# Patient Record
Sex: FEMALE | Race: WHITE | Employment: FULL TIME | URBAN - METROPOLITAN AREA
[De-identification: names, ages, dates, MRNs, and addresses within clinical notes are randomized per-mention and may not be internally consistent; named-entity substitution may affect disease eponyms.]

---

## 2019-02-19 ENCOUNTER — ROUTINE PRENATAL (OUTPATIENT)
Dept: OBGYN CLINIC | Age: 34
End: 2019-02-19
Payer: COMMERCIAL

## 2019-02-19 VITALS
BODY MASS INDEX: 55.32 KG/M2 | DIASTOLIC BLOOD PRESSURE: 72 MMHG | HEART RATE: 90 BPM | HEIGHT: 61 IN | SYSTOLIC BLOOD PRESSURE: 132 MMHG | WEIGHT: 293 LBS

## 2019-02-19 DIAGNOSIS — O35.5XX0 SUSPECTED DAMAGE TO FETUS FROM DRUGS, AFFECTING MANAGEMENT OF MOTHER, SINGLE OR UNSPECIFIED FETUS: ICD-10-CM

## 2019-02-19 DIAGNOSIS — E66.01 MATERNAL MORBID OBESITY IN SECOND TRIMESTER, ANTEPARTUM (HCC): ICD-10-CM

## 2019-02-19 DIAGNOSIS — O99.212 MATERNAL MORBID OBESITY IN SECOND TRIMESTER, ANTEPARTUM (HCC): ICD-10-CM

## 2019-02-19 DIAGNOSIS — O10.012 ESSENTIAL HYPERTENSION AFFECTING PREGNANCY IN SECOND TRIMESTER: ICD-10-CM

## 2019-02-19 DIAGNOSIS — O09.522 ELDERLY MULTIGRAVIDA, SECOND TRIMESTER: Primary | ICD-10-CM

## 2019-02-19 DIAGNOSIS — Z36.89 ENCOUNTER FOR FETAL ANATOMIC SURVEY: ICD-10-CM

## 2019-02-19 DIAGNOSIS — Z3A.24 24 WEEKS GESTATION OF PREGNANCY: ICD-10-CM

## 2019-02-19 DIAGNOSIS — O09.892 PRIOR PREGNANCY COMPLICATED BY PIH, ANTEPARTUM, SECOND TRIMESTER: ICD-10-CM

## 2019-02-19 LAB
GLUCOSE URINE, POC: NEGATIVE
PROTEIN UA: NEGATIVE

## 2019-02-19 PROCEDURE — 99243 OFF/OP CNSLTJ NEW/EST LOW 30: CPT | Performed by: OBSTETRICS & GYNECOLOGY

## 2019-02-19 PROCEDURE — 76819 FETAL BIOPHYS PROFIL W/O NST: CPT | Performed by: OBSTETRICS & GYNECOLOGY

## 2019-02-19 PROCEDURE — 99201 HC NEW PT, E/M LEVEL 1: CPT | Performed by: OBSTETRICS & GYNECOLOGY

## 2019-02-19 PROCEDURE — 76820 UMBILICAL ARTERY ECHO: CPT | Performed by: OBSTETRICS & GYNECOLOGY

## 2019-02-19 PROCEDURE — 76811 OB US DETAILED SNGL FETUS: CPT | Performed by: OBSTETRICS & GYNECOLOGY

## 2019-02-19 PROCEDURE — 81002 URINALYSIS NONAUTO W/O SCOPE: CPT | Performed by: OBSTETRICS & GYNECOLOGY

## 2019-03-20 ENCOUNTER — ROUTINE PRENATAL (OUTPATIENT)
Dept: OBGYN CLINIC | Age: 34
End: 2019-03-20
Payer: COMMERCIAL

## 2019-03-20 VITALS
HEIGHT: 61 IN | WEIGHT: 293 LBS | SYSTOLIC BLOOD PRESSURE: 122 MMHG | BODY MASS INDEX: 55.32 KG/M2 | HEART RATE: 79 BPM | DIASTOLIC BLOOD PRESSURE: 82 MMHG

## 2019-03-20 DIAGNOSIS — Z3A.28 28 WEEKS GESTATION OF PREGNANCY: ICD-10-CM

## 2019-03-20 DIAGNOSIS — Z36.89 ENCOUNTER FOR FETAL ANATOMIC SURVEY: ICD-10-CM

## 2019-03-20 DIAGNOSIS — O10.012 ESSENTIAL HYPERTENSION AFFECTING PREGNANCY IN SECOND TRIMESTER: ICD-10-CM

## 2019-03-20 DIAGNOSIS — O09.523 ELDERLY MULTIGRAVIDA, THIRD TRIMESTER: ICD-10-CM

## 2019-03-20 DIAGNOSIS — O99.810 GLUCOSE INTOLERANCE OF PREGNANCY: Primary | ICD-10-CM

## 2019-03-20 DIAGNOSIS — O99.843 PREVIOUS GASTRIC BYPASS AFFECTING PREGNANCY IN THIRD TRIMESTER, ANTEPARTUM: ICD-10-CM

## 2019-03-20 DIAGNOSIS — O34.219 PREVIOUS CESAREAN DELIVERY, ANTEPARTUM CONDITION OR COMPLICATION: ICD-10-CM

## 2019-03-20 LAB
GLUCOSE URINE, POC: NORMAL
PROTEIN UA: NEGATIVE

## 2019-03-20 PROCEDURE — 76819 FETAL BIOPHYS PROFIL W/O NST: CPT | Performed by: OBSTETRICS & GYNECOLOGY

## 2019-03-20 PROCEDURE — 81002 URINALYSIS NONAUTO W/O SCOPE: CPT | Performed by: OBSTETRICS & GYNECOLOGY

## 2019-03-20 PROCEDURE — 76820 UMBILICAL ARTERY ECHO: CPT | Performed by: OBSTETRICS & GYNECOLOGY

## 2019-03-20 PROCEDURE — 99214 OFFICE O/P EST MOD 30 MIN: CPT | Performed by: OBSTETRICS & GYNECOLOGY

## 2019-03-20 PROCEDURE — 76805 OB US >/= 14 WKS SNGL FETUS: CPT | Performed by: OBSTETRICS & GYNECOLOGY

## 2019-03-20 PROCEDURE — 99211 OFF/OP EST MAY X REQ PHY/QHP: CPT | Performed by: OBSTETRICS & GYNECOLOGY

## 2019-03-20 RX ORDER — LABETALOL 100 MG/1
100 TABLET, FILM COATED ORAL 3 TIMES DAILY
COMMUNITY

## 2019-03-20 RX ORDER — BLOOD-GLUCOSE METER
1 KIT MISCELLANEOUS DAILY PRN
Qty: 1 KIT | Refills: 0 | Status: SHIPPED | OUTPATIENT
Start: 2019-03-20 | End: 2019-04-23 | Stop reason: ALTCHOICE

## 2019-03-20 RX ORDER — FERROUS SULFATE 325(65) MG
325 TABLET ORAL
COMMUNITY

## 2019-03-28 ENCOUNTER — HOSPITAL ENCOUNTER (OUTPATIENT)
Age: 34
Discharge: HOME | End: 2019-03-28
Attending: OBSTETRICS & GYNECOLOGY | Admitting: OBSTETRICS & GYNECOLOGY
Payer: COMMERCIAL

## 2019-03-28 VITALS — SYSTOLIC BLOOD PRESSURE: 128 MMHG | HEART RATE: 91 BPM | DIASTOLIC BLOOD PRESSURE: 76 MMHG | RESPIRATION RATE: 16 BRPM

## 2019-03-28 PROBLEM — Z3A.29 29 WEEKS GESTATION OF PREGNANCY: Status: ACTIVE | Noted: 2019-03-28

## 2019-03-28 LAB
BACTERIA: ABNORMAL /HPF
BILIRUBIN URINE: NEGATIVE
BLOOD, URINE: NEGATIVE
CLARITY: CLEAR
COLOR: YELLOW
FETAL FIBRONECTIN: NEGATIVE
GLUCOSE URINE: NEGATIVE MG/DL
KETONES, URINE: NEGATIVE MG/DL
LEUKOCYTE ESTERASE, URINE: ABNORMAL
NITRITE, URINE: NEGATIVE
PH UA: 6 (ref 5–9)
PROTEIN UA: NEGATIVE MG/DL
RBC UA: ABNORMAL /HPF (ref 0–2)
SPECIFIC GRAVITY UA: <=1.005 (ref 1–1.03)
UROBILINOGEN, URINE: 0.2 E.U./DL
WBC UA: ABNORMAL /HPF (ref 0–5)

## 2019-03-28 PROCEDURE — 81001 URINALYSIS AUTO W/SCOPE: CPT

## 2019-03-28 PROCEDURE — 99211 OFF/OP EST MAY X REQ PHY/QHP: CPT

## 2019-03-28 PROCEDURE — 99213 OFFICE O/P EST LOW 20 MIN: CPT | Performed by: NURSE PRACTITIONER

## 2019-03-28 PROCEDURE — 82731 ASSAY OF FETAL FIBRONECTIN: CPT

## 2019-03-28 RX ORDER — NITROFURANTOIN 25; 75 MG/1; MG/1
100 CAPSULE ORAL 2 TIMES DAILY
Qty: 14 CAPSULE | Refills: 0 | Status: SHIPPED | OUTPATIENT
Start: 2019-03-28 | End: 2019-04-04

## 2019-04-02 ENCOUNTER — ROUTINE PRENATAL (OUTPATIENT)
Dept: OBGYN CLINIC | Age: 34
End: 2019-04-02
Payer: COMMERCIAL

## 2019-04-02 VITALS
HEART RATE: 80 BPM | HEIGHT: 61 IN | WEIGHT: 293 LBS | SYSTOLIC BLOOD PRESSURE: 135 MMHG | DIASTOLIC BLOOD PRESSURE: 86 MMHG | BODY MASS INDEX: 55.32 KG/M2

## 2019-04-02 DIAGNOSIS — E66.01 MATERNAL MORBID OBESITY IN THIRD TRIMESTER, ANTEPARTUM (HCC): ICD-10-CM

## 2019-04-02 DIAGNOSIS — O09.523 ELDERLY MULTIGRAVIDA, THIRD TRIMESTER: ICD-10-CM

## 2019-04-02 DIAGNOSIS — O99.843 PREVIOUS GASTRIC BYPASS AFFECTING PREGNANCY IN THIRD TRIMESTER, ANTEPARTUM: ICD-10-CM

## 2019-04-02 DIAGNOSIS — O99.213 MATERNAL MORBID OBESITY IN THIRD TRIMESTER, ANTEPARTUM (HCC): ICD-10-CM

## 2019-04-02 DIAGNOSIS — O10.013 ESSENTIAL HYPERTENSION AFFECTING PREGNANCY IN THIRD TRIMESTER: Primary | ICD-10-CM

## 2019-04-02 DIAGNOSIS — Z3A.30 30 WEEKS GESTATION OF PREGNANCY: ICD-10-CM

## 2019-04-02 LAB
GLUCOSE URINE, POC: NEGATIVE
PROTEIN UA: NEGATIVE

## 2019-04-02 PROCEDURE — 76815 OB US LIMITED FETUS(S): CPT | Performed by: OBSTETRICS & GYNECOLOGY

## 2019-04-02 PROCEDURE — 99213 OFFICE O/P EST LOW 20 MIN: CPT | Performed by: OBSTETRICS & GYNECOLOGY

## 2019-04-02 PROCEDURE — 81002 URINALYSIS NONAUTO W/O SCOPE: CPT | Performed by: OBSTETRICS & GYNECOLOGY

## 2019-04-02 PROCEDURE — 76819 FETAL BIOPHYS PROFIL W/O NST: CPT | Performed by: OBSTETRICS & GYNECOLOGY

## 2019-04-02 PROCEDURE — 99211 OFF/OP EST MAY X REQ PHY/QHP: CPT | Performed by: OBSTETRICS & GYNECOLOGY

## 2019-04-02 PROCEDURE — 76820 UMBILICAL ARTERY ECHO: CPT | Performed by: OBSTETRICS & GYNECOLOGY

## 2019-04-02 NOTE — LETTER
19     RE:  Kary Sanchez   : 1985   AGE: 26 y.o. REFERRING PHYSICIAN:    Sheila Gamino MD    Dear   Mrs. Kary Sanchez a 26 y.o.  [id number]  is seen today on follow up in our office. REASON FOR APPOINTMENT:    · Follow up on a pregnant patient with morbid obesity, positive screen for gestational diabetes one hour GTT, status post gastric bypass surgery, advanced maternal age, essential hypertension, and first trimester exposure to an ACE inhibitor. MEDICATIONS:    Prenatal Vitamins once per day   Ferrous sulfate 325 mg by mouth per day. Labetalol 100 mg by mouth 3 times a day. Carafate 500 mg by mouth every three hours. Prilosec 20 mg by mouth per day. Flonase for treatment of sinus problems  Macrobid 100 mg by mouth twice a day      INTERVAL HISTORY:  Since her last visit to our office, Mrs Kayr Sanchez was diagnosed to have a urinary tract infection and is receiving treatment with Macrobid. Otherwise she had an uneventful course of pregnancy since her last visit to our office. When seen today in our office she had no complaints. PHYSICAL EXAMINATION:  General Appearance:  Healthy looking, alert, no acute distress. Eyes:     No pallor, no icterus, no photophobia. Ears:     No ear drainage. Nose:     No nasal drainage, no paranasal sinus tenderness. Throat:   Mucosa moist, no oral thrush, no exudate. Neck:     No nuchal rigidity. Back:     No CVA tenderness. Abdomen:    Soft nontender. Extremities:    No pretibial pitting edema, no calf muscle tenderness. Skin:     No rashes, no lesions. BP: 135/86 Weight: (!) 335 lb (152 kg) Height: 5' 1\" (154.9 cm) Pulse: 80     Body mass index is 63.3 kg/m². Urine dipstick:  Glucose : Negative   Albumin:  Negative       An ultrasound evaluation was done in our office today. Please refer to the enclosed copy of the ultrasound report for further information.     Chart and Lab Work Review: Review of her log book shows: Adequate sugar control with fasting sugars under 92 and 2hr pp under 120 mg/dl. IMPRESSION:  1. A  30w3d  intrauterine gestation. 2. Advanced maternal age. 3. Elevated one hour 50 g Glucola test on 3/9/2019.  4. Could not tolerate the three-hour glucose tolerance test in your office (threw up)  5. Essential hypertension. 6. Morbid obesity. 7. Previous  delivery  8. First trimester exposure to ACE inhibitor lisinopril. 9. Status post gastric bypass surgery. 10. Urinary tract infection, on treatment with Macrobid    RECOMMENDATIONS/PLAN:  I discussed with the patient the following points:    1. The size of her baby is appropriate for gestational age. 2. Her blood pressures are well controlled with the present dose of Labetalol. Essential hypertension is associated with an increased risk of developing intrauterine growth restriction and an increased risk of developing superimposed preeclampsia. The systolic should be kept under 349, diastolic under 003.  3. Her sugars are well controlled. I recommend that she tests her sugars fasting and two hour following each meal for three days prior to her visits to our office and that she brings of her log book for review to our office every visit. 4. Fetal well-being was confirmed today. The amount of fluid around baby is normal.  The Biophysical profile score of 8/8 is reassuring, and the umbilical artery Doppler studies are normal.  5. She should monitor fetal well-being at home by counting movements after dinner. Her baby should  move 10 times in 2 hours; otherwise, she should call your office immediately. She is also to call, if she develops any headaches, blurred vision, abdominal pain, bleeding, or spotting, which are signs of preeclampsia. 6. She is to continue to follow with you in your office for ongoing obstetric care.   7. I recommend a follow up ultrasound evaluation in 2 weeks, to check on fetal well being anatomy and growth. Thank you again, doctor, for allowing us to be of service to your patient. If I can be of further assistance, please do not hesitate to call. Sincerely,        Steve Reaves M.D., Sandra Delacruz    Current encounter billing:  [place] OFFICE OUTPATIENT VISIT 15 MINUTES [[id number]]  US OB 1 or More Fetus Limited [63934 Custom]  US Fetal Biophysical Profile WO Non Stress Testing [94370 Custom]  US Doppler Fetal Umbilical Artery [EQH8997 Custom]      **This report has been created using voice recognition software.  It may contain minor errors     which are inherent in voice recognition technology**

## 2019-04-02 NOTE — PATIENT INSTRUCTIONS
Call your primary obstetrician with bleeding, leaking of fluid, abdominal tenderness, headache, blurry vision, epigastric pain and increased urinary frequency. Any questions contact [facility] at [phone]. If you are experiencing an emergency and need immediate help, call 911 or go to go emergency room or labor and delivery. Do kick counts after dinner. Call your primary obstetrician if less than 10 kicks in 2 hours after dinner. Call your primary obstetrician with bleeding, leaking of fluid, abdominal tenderness, headache, blurry vision, epigastric pain and increased urinary frequency. if you are sick, not feeling well or have an infectious process going on please reschedule your appointment by calling [phone]. Also if any family members are not feeling well, please do not bring them to your appointment. We appreciate your cooperation. We are doing this in order to protect our pregnant mothers+ their babies. Patient Education        Weeks 30 to 28 of Your Pregnancy: Care Instructions  Your Care Instructions    You have made it to the final months of your pregnancy. By now, your baby is really starting to look like a baby, with hair and plump skin. As you enter the final weeks of pregnancy, the reality of having a baby may start to set in. This is the time to settle on a name, get your household in order, set up a safe nursery, and find quality  if needed. Doing these things in advance will allow you to focus on caring for and enjoying your new baby. You may also want to have a tour of your hospital's labor and delivery unit to get a better idea of what to expect while you are in the hospital.  During these last months, it is very important to take good care of yourself and pay attention to what your body needs. If your doctor says it is okay for you to work, don't push yourself too hard. Use the tips provided in this care sheet to ease heartburn and care for varicose veins.   If you haven't already crossed at the ankles, not the knees. · Sit with your feet propped up. · Avoid tight clothing or stockings. Wear support hose. · Exercise regularly. Try walking for at least 30 minutes a day. Where can you learn more? Go to https://jonh.healthChampionVillage. org and sign in to your Perfect Commerce account. Enter L952 in the Backspaces box to learn more about \"Weeks 30 to 32 of Your Pregnancy: Care Instructions. \"     If you do not have an account, please click on the \"Sign Up Now\" link. Current as of: September 5, 2018  Content Version: 11.9  © 7864-1742 [employer]. Care instructions adapted under license by [facility] ([facility]). If you have questions about a medical condition or this instruction, always ask your healthcare professional. [place] [address] any warranty or liability for your use of this information. Patient Education        Learning About When to Call Your Doctor During Pregnancy (After 20 Weeks)  Your Care Instructions  It's common to have concerns about what might be a problem during pregnancy. Although most pregnant women don't have any serious problems, it's important to know when to call your doctor if you have certain symptoms or signs of labor. These are general suggestions. Your doctor may give you some more information about when to call. When to call your doctor (after 20 weeks)  Call 911 anytime you think you may need emergency care. For example, call if:  · You have severe vaginal bleeding. · You have sudden, severe pain in your belly. · You passed out (lost consciousness). · You have a seizure. · You see or feel the umbilical cord. · You think you are about to deliver your baby and can't make it safely to the hospital.  Call your doctor now or seek immediate medical care if:  · You have vaginal bleeding. · You have belly pain. · You have a fever.   · You have symptoms of preeclampsia, such as:  ? Sudden swelling of your face, hands, or feet.  ? New vision problems (such as dimness or blurring). ? A severe headache. · You have a sudden release of fluid from your vagina. (You think your water broke.)  · You think that you may be in labor. This means that you've had at least 4 contractions within 20 minutes or at least 8 contractions in an hour. · You notice that your baby has stopped moving or is moving much less than normal.  · You have symptoms of a urinary tract infection. These may include:  ? Pain or burning when you urinate. ? A frequent need to urinate without being able to pass much urine. ? Pain in the flank, which is just below the rib cage and above the waist on either side of the back. ? Blood in your urine. Watch closely for changes in your health, and be sure to contact your doctor if:  · You have vaginal discharge that smells bad. · You have skin changes, such as:  ? A rash. ? Itching. ? Yellow color to your skin. · You have other concerns about your pregnancy. If you have labor signs at 37 weeks or more  If you have signs of labor at 37 weeks or more, your doctor may tell you to call when your labor becomes more active. Symptoms of active labor include:  · Contractions that are regular. · Contractions that are less than 5 minutes apart. · Contractions that are hard to talk through. Follow-up care is a key part of your treatment and safety. Be sure to make and go to all appointments, and call your doctor if you are having problems. It's also a good idea to know your test results and keep a list of the medicines you take. Where can you learn more? Go to https://jonh.[employer]ella Learning. org and sign in to your Goodoc account. Enter  in the [employer] box to learn more about \"Learning About When to Call Your Doctor During Pregnancy (After 20 Weeks). \"     If you do not have an account, please click on the \"Sign Up Now\" link.   Current as of: September 5, 2018  Content Version: 11.9  © 2075-9251 Healthwise, Incorporated. Care instructions adapted under license by [facility] ([facility]). If you have questions about a medical condition or this instruction, always ask your healthcare professional. [address] any warranty or liability for your use of this information. Patient Education        Counting Your Baby's Kicks: Care Instructions  Your Care Instructions    Counting your baby's kicks is one way your doctor can tell that your baby is healthy. Most women--especially in a first pregnancy--feel their baby move for the first time between 16 and 22 weeks. The movement may feel like flutters rather than kicks. Your baby may move more at certain times of the day. When you are active, you may notice less kicking than when you are resting. At your prenatal visits, your doctor will ask whether the baby is active. In your last trimester, your doctor may ask you to count the number of times you feel your baby move. Follow-up care is a key part of your treatment and safety. Be sure to make and go to all appointments, and call your doctor if you are having problems. It's also a good idea to know your test results and keep a list of the medicines you take. How do you count fetal kicks? · A common method of checking your baby's movement is to count the number of kicks or moves you feel in 1 hour. Ten movements (such as kicks, flutters, or rolls) in 1 hour are normal. Some doctors suggest that you count in the morning until you get to 10 movements. Then you can quit for that day and start again the next day. · Pick your baby's most active time of day to count. This may be any time from morning to evening. · If you do not feel 10 movements in an hour, your baby may be sleeping. Wait for the next hour and count again. When should you call for help?   Call your doctor now or seek immediate medical care if:    · You noticed that your baby has stopped moving or is moving much less than normal.  Watch closely for changes in your health, and be sure to contact your doctor if you have any problems. Where can you learn more? Go to https://chpepiceweb.Cinegif. org and sign in to your Futuretec account. Enter Z634 in the ETF Securities box to learn more about \"Counting Your Baby's Kicks: Care Instructions. \"     If you do not have an account, please click on the \"Sign Up Now\" link. Current as of: September 5, 2018  Content Version: 11.9  © 0533-9554 [employer]. Care instructions adapted under license by [facility] ([facility]). If you have questions about a medical condition or this instruction, always ask your healthcare professional. [address] any warranty or liability for your use of this information. Patient Education        High Blood Pressure in Pregnancy: Care Instructions  Your Care Instructions    High blood pressure (hypertension) means that the force of blood against your artery walls is too strong. Mild high blood pressure during pregnancy is not usually dangerous. Your doctor will probably just want to watch you closely. But when blood pressure is very high, it can reduce oxygen to your baby. This can affect how well your baby grows. High blood pressure also means that you are at higher risk for:  · Preeclampsia. This is a problem that includes high blood pressure and damage to your liver or kidneys. It can also reduce how much oxygen your baby gets. In some cases, it leads to eclampsia. Eclampsia causes seizures. · Placental abruption. This is a problem when the placenta separates from the uterus before birth. It prevents the baby from getting enough oxygen and nutrients. Sometimes it can cause death for the baby and the mother. To prevent problems for you or your baby, you will have to check your blood pressure often. You will do this until after your baby is born.   If your blood pressure rises suddenly or is very high during your pregnancy, your doctor may prescribe medicines. They can usually control blood pressure. If your blood pressure affects your or your baby's health, your doctor may need to deliver your baby early. After your baby is born, your blood pressure will probably improve. But sometimes blood pressure problems continue after birth. Follow-up care is a key part of your treatment and safety. Be sure to make and go to all appointments, and call your doctor if you are having problems. It's also a good idea to know your test results and keep a list of the medicines you take. How can you care for yourself at home? · Take and write down your blood pressure at home if your doctor tells you to. · Take your medicines exactly as prescribed. Call your doctor if you think you are having a problem with your medicine. · Do not smoke. If you need help quitting, talk to your doctor about stop-smoking programs and medicines. These can increase your chances of quitting for good. · Do not gain too much weight during your pregnancy. Talk to your doctor about how much weight gain is healthy. · Eat a healthy diet. · If your doctor says it's okay, get regular exercise. Walking or swimming several times a week can be healthy for you and your baby. · Reduce stress, and find time to relax. This is very important if you continue to work or have a busy schedule. It's also important if you have small children at home. When should you call for help? Call 911 anytime you think you may need emergency care. For example, call if:    · You passed out (lost consciousness).     · You have a seizure.    Call your doctor now or seek immediate medical care if:    · You have symptoms of preeclampsia, such as:  ? Sudden swelling of your face, hands, or feet. ? New vision problems (such as dimness or blurring).   ? A severe headache.     · Your blood pressure is higher than it should be or rises suddenly.     · You have new nausea or vomiting.     · You think that you are in labor.     · You have pain in your belly or pelvis.    Watch closely for changes in your health, and be sure to contact your doctor if:    · You gain weight rapidly. Where can you learn more? Go to https://chpepiceweb.healthLoopport. org and sign in to your CTMG account. Enter C268 in the Pointstic box to learn more about \"High Blood Pressure in Pregnancy: Care Instructions. \"     If you do not have an account, please click on the \"Sign Up Now\" link. Current as of: September 5, 2018  Content Version: 11.9  © 8265-4026 [employer]Dox, Incorporated. Care instructions adapted under license by [facility] ([facility]). If you have questions about a medical condition or this instruction, always ask your healthcare professional. [address] any warranty or liability for your use of this information.

## 2019-04-16 ENCOUNTER — ROUTINE PRENATAL (OUTPATIENT)
Dept: OBGYN CLINIC | Age: 34
End: 2019-04-16
Payer: COMMERCIAL

## 2019-04-16 VITALS
DIASTOLIC BLOOD PRESSURE: 84 MMHG | SYSTOLIC BLOOD PRESSURE: 139 MMHG | HEIGHT: 61 IN | WEIGHT: 293 LBS | HEART RATE: 79 BPM | BODY MASS INDEX: 55.32 KG/M2

## 2019-04-16 DIAGNOSIS — Z3A.32 32 WEEKS GESTATION OF PREGNANCY: ICD-10-CM

## 2019-04-16 DIAGNOSIS — O99.213 MATERNAL MORBID OBESITY IN THIRD TRIMESTER, ANTEPARTUM (HCC): ICD-10-CM

## 2019-04-16 DIAGNOSIS — E66.01 MATERNAL MORBID OBESITY IN THIRD TRIMESTER, ANTEPARTUM (HCC): ICD-10-CM

## 2019-04-16 DIAGNOSIS — O10.013 ESSENTIAL HYPERTENSION AFFECTING PREGNANCY IN THIRD TRIMESTER: Primary | ICD-10-CM

## 2019-04-16 DIAGNOSIS — O09.523 ELDERLY MULTIGRAVIDA, THIRD TRIMESTER: ICD-10-CM

## 2019-04-16 DIAGNOSIS — O99.843 PREVIOUS GASTRIC BYPASS AFFECTING PREGNANCY IN THIRD TRIMESTER, ANTEPARTUM: ICD-10-CM

## 2019-04-16 LAB
GLUCOSE URINE, POC: NORMAL
PROTEIN UA: NEGATIVE

## 2019-04-16 PROCEDURE — 76816 OB US FOLLOW-UP PER FETUS: CPT | Performed by: OBSTETRICS & GYNECOLOGY

## 2019-04-16 PROCEDURE — 76818 FETAL BIOPHYS PROFILE W/NST: CPT | Performed by: OBSTETRICS & GYNECOLOGY

## 2019-04-16 PROCEDURE — 81002 URINALYSIS NONAUTO W/O SCOPE: CPT | Performed by: OBSTETRICS & GYNECOLOGY

## 2019-04-16 PROCEDURE — 99213 OFFICE O/P EST LOW 20 MIN: CPT | Performed by: OBSTETRICS & GYNECOLOGY

## 2019-04-16 PROCEDURE — 76820 UMBILICAL ARTERY ECHO: CPT | Performed by: OBSTETRICS & GYNECOLOGY

## 2019-04-16 PROCEDURE — 99211 OFF/OP EST MAY X REQ PHY/QHP: CPT | Performed by: OBSTETRICS & GYNECOLOGY

## 2019-04-16 RX ORDER — FLUTICASONE PROPIONATE 50 MCG
2 SPRAY, SUSPENSION (ML) NASAL DAILY
COMMUNITY

## 2019-04-16 NOTE — LETTER
19     RE:  Garth Jiménez   : 1985   AGE: 26 y.o. REFERRING PHYSICIAN:  Mynor Rodriguez MD    Dear   Mrs. Garth Jiménez a 26 y.o.  [id number]  is seen today on follow up in our office. REASON FOR APPOINTMENT:    · Follow up on a pregnant patient with morbid obesity, positive screen for gestational diabetes one hour GTT, status post gastric bypass surgery, advanced maternal age, essential hypertension, and first trimester exposure to an ACE inhibitor. MEDICATIONS:    Prenatal Vitamins once per day   Ferrous sulfate 325 mg by mouth per day. Labetalol 100 mg by mouth 3 times a day. Carafate 500 mg by mouth every three hours. Prilosec 20 mg by mouth per day. Flonase for treatment of sinus problems    INTERVAL HISTORY:  Mrs Garth Jiménez had an uneventful course of pregnancy since her last visit to our office. When seen today in our office she had no complaints. PHYSICAL EXAMINATION:  General Appearance:  Healthy looking, alert, no acute distress. Eyes:     No pallor, no icterus, no photophobia. Ears:     No ear drainage. Nose:     No nasal drainage, no paranasal sinus tenderness. Throat:   Mucosa moist, no oral thrush, no exudate. Neck:     No nuchal rigidity. Back:     No CVA tenderness. Abdomen:    Soft nontender. Extremities:    No pretibial pitting edema, no calf muscle tenderness. Skin:     No rashes, no lesions. BP: 139/84 Weight: (!) 336 lb (152.4 kg) Height: 5' 1\" (154.9 cm) Pulse: 79     Body mass index is 63.49 kg/m². Urine dipstick:  Glucose : Negative   Albumin:  Negative       An ultrasound evaluation was done in our office today. Please refer to the enclosed copy of the ultrasound report for further information. Chart and Lab Work Review:  Review of her log book shows: Adequate sugar control with fasting sugars under 92 and 2hr pp under 120 mg/dl. IMPRESSION:  1. A  32w3d  intrauterine gestation. 2. Advanced maternal age. 9. She should be monitored with nonstress test every Friday in the labor unit and with biophysical profiles and umbilical artery Doppler once a week in our office every Tuesday for remainder of pregnancy. Thank you again, doctor, for allowing us to be of service to your patient. If I can be of further assistance, please do not hesitate to call. Sincerely,        Angela Hills M.D., [address] [facility]    Current encounter billing:  [place] OFFICE OUTPATIENT VISIT 15 MINUTES [[id number]]  US OB Follow Up Transabdominal Approach [JJQ203 Custom]  Biophysical profile [OBO12 Custom]  US Doppler Fetal Umbilical Artery [[id number] Custom]    **This report has been created using voice recognition software.  It may contain minor errors     which are inherent in voice recognition technology**

## 2019-04-16 NOTE — PROGRESS NOTES
NST completed. Baseline 140 with moderate variability. accelelrations do not meet criteria for reactivity.  Non-reactive per dr Marine Lockhart

## 2019-04-16 NOTE — PATIENT INSTRUCTIONS
Call your primary obstetrician with bleeding, leaking of fluid, abdominal tenderness, headache, blurry vision, epigastric pain and increased urinary frequency. Any questions contact [facility] at [phone]. If you are experiencing an emergency and need immediate help, call 911 or go to go emergency room or labor and delivery. Do kick counts after dinner. Call your primary obstetrician if less than 10 kicks in 2 hours after dinner. Call your primary obstetrician with bleeding, leaking of fluid, abdominal tenderness, headache, blurry vision, epigastric pain and increased urinary frequency. if you are sick, not feeling well or have an infectious process going on please reschedule your appointment by calling [phone]. Also if any family members are not feeling well, please do not bring them to your appointment. We appreciate your cooperation. We are doing this in order to protect our pregnant mothers+ their babies. Patient Education        Weeks 32 to 29 of Your Pregnancy: Care Instructions  Your Care Instructions    During the last few weeks of your pregnancy, you may have more aches and pains. It's important to rest when you can. Your growing baby is putting more pressure on your bladder. So you may need to urinate more often. Hemorrhoids are also common. These are painful, itchy veins in the rectal area. In the 36th week, most women have a test for group B streptococcus (GBS). GBS is a common bacteria that can live in the vagina and rectum. It can make your baby sick after birth. If you test positive, you will get antibiotics during labor. These will keep your baby from getting the bacteria. You may want to talk with your doctor about banking your baby's umbilical cord blood. This is the blood left in the cord after birth. If you want to save this blood, you must arrange it ahead of time. You can't decide at the last minute.   If you haven't already had the Tdap shot during this pregnancy, talk to your doctor about getting it. It will help protect your  against pertussis infection. Follow-up care is a key part of your treatment and safety. Be sure to make and go to all appointments, and call your doctor if you are having problems. It's also a good idea to know your test results and keep a list of the medicines you take. How can you care for yourself at home? Ease hemorrhoids  · Get more liquids, fruits, vegetables, and fiber in your diet. This will help keep your stools soft. · Avoid sitting for too long. Lie on your left side several times a day. · Clean yourself with soft, moist toilet paper. Or you can use witch hazel pads or personal hygiene pads. · If you are uncomfortable, try ice packs. Or you can sit in a warm sitz bath. Do these for 20 minutes at a time, as needed. · Use hydrocortisone cream for pain and itching. Two examples are Anusol and Preparation H Hydrocortisone. · Ask your doctor about taking an over-the-counter stool softener. Consider breastfeeding  · Experts recommend that women breastfeed for 1 year or longer. Breast milk is the perfect food for babies. · Breast milk is easier for babies to digest than formula. And it is always available, just the right temperature, and free. · Breast milk may help protect your child from some health problems.  babies are less likely than formula-fed babies to:  ? Get ear infections, colds, diarrhea, and pneumonia. ? Be obese or get diabetes later in life. · Women who breastfeed have less bleeding after the birth. Their uteruses also shrink back faster. · Some women who breastfeed lose weight faster. Making milk burns calories. · Breastfeeding can lower your risk of breast cancer, ovarian cancer, and osteoporosis. Decide about circumcision for boys  · As you make this decision, it may help to think about your personal, Latter-day, and family traditions.  You get to decide if you will keep your son's penis natural or if he will be circumcised. · If you decide that you would like to have your baby circumcised, talk with your doctor. You can share your concerns about pain. And you can discuss your preferences for anesthesia. Where can you learn more? Go to [email or web address]://jonh.[email or web address] org and sign in to your [email or web address] account. Enter W191 in the [facility] box to learn more about \"Weeks 32 to 34 of Your Pregnancy: Care Instructions. \"     If you do not have an account, please click on the \"Sign Up Now\" link. Current as of: September 5, 2018  Content Version: 11.9  © 6094-1961 [employer]. Care instructions adapted under license by [facility] ([facility]). If you have questions about a medical condition or this instruction, always ask your healthcare professional. [address] any warranty or liability for your use of this information. Patient Education        Learning About When to Call Your Doctor During Pregnancy (After 20 Weeks)  Your Care Instructions  It's common to have concerns about what might be a problem during pregnancy. Although most pregnant women don't have any serious problems, it's important to know when to call your doctor if you have certain symptoms or signs of labor. These are general suggestions. Your doctor may give you some more information about when to call. When to call your doctor (after 20 weeks)  Call 911 anytime you think you may need emergency care. For example, call if:  · You have severe vaginal bleeding. · You have sudden, severe pain in your belly. · You passed out (lost consciousness). · You have a seizure. · You see or feel the umbilical cord. · You think you are about to deliver your baby and can't make it safely to the hospital.  Call your doctor now or seek immediate medical care if:  · You have vaginal bleeding. · You have belly pain. · You have a fever.   · You have symptoms of preeclampsia, such as:  ? Sudden swelling of your face, hands, or feet.  ? New vision problems (such as dimness or blurring). ? A severe headache. · You have a sudden release of fluid from your vagina. (You think your water broke.)  · You think that you may be in labor. This means that you've had at least 4 contractions within 20 minutes or at least 8 contractions in an hour. · You notice that your baby has stopped moving or is moving much less than normal.  · You have symptoms of a urinary tract infection. These may include:  ? Pain or burning when you urinate. ? A frequent need to urinate without being able to pass much urine. ? Pain in the flank, which is just below the rib cage and above the waist on either side of the back. ? Blood in your urine. Watch closely for changes in your health, and be sure to contact your doctor if:  · You have vaginal discharge that smells bad. · You have skin changes, such as:  ? A rash. ? Itching. ? Yellow color to your skin. · You have other concerns about your pregnancy. If you have labor signs at 37 weeks or more  If you have signs of labor at 37 weeks or more, your doctor may tell you to call when your labor becomes more active. Symptoms of active labor include:  · Contractions that are regular. · Contractions that are less than 5 minutes apart. · Contractions that are hard to talk through. Follow-up care is a key part of your treatment and safety. Be sure to make and go to all appointments, and call your doctor if you are having problems. It's also a good idea to know your test results and keep a list of the medicines you take. Where can you learn more? Go to https://jonh.Mesh [email or web address] org and sign in to your [employer] account. Enter  in the [employer] box to learn more about \"Learning About When to Call Your Doctor During Pregnancy (After 20 Weeks). \"     If you do not have an account, please click on the \"Sign Up Now\" link.   Current as of: September 5, 2018  Content Version: 11.9  © 5302-2202 Healthwise, Incorporated. Care instructions adapted under license by [facility] ([facility]). If you have questions about a medical condition or this instruction, always ask your healthcare professional. [address] any warranty or liability for your use of this information. Patient Education        Counting Your Baby's Kicks: Care Instructions  Your Care Instructions    Counting your baby's kicks is one way your doctor can tell that your baby is healthy. Most women--especially in a first pregnancy--feel their baby move for the first time between 16 and 22 weeks. The movement may feel like flutters rather than kicks. Your baby may move more at certain times of the day. When you are active, you may notice less kicking than when you are resting. At your prenatal visits, your doctor will ask whether the baby is active. In your last trimester, your doctor may ask you to count the number of times you feel your baby move. Follow-up care is a key part of your treatment and safety. Be sure to make and go to all appointments, and call your doctor if you are having problems. It's also a good idea to know your test results and keep a list of the medicines you take. How do you count fetal kicks? · A common method of checking your baby's movement is to count the number of kicks or moves you feel in 1 hour. Ten movements (such as kicks, flutters, or rolls) in 1 hour are normal. Some doctors suggest that you count in the morning until you get to 10 movements. Then you can quit for that day and start again the next day. · Pick your baby's most active time of day to count. This may be any time from morning to evening. · If you do not feel 10 movements in an hour, your baby may be sleeping. Wait for the next hour and count again. When should you call for help?   Call your doctor now or seek immediate medical care if:    · You noticed that your baby has stopped moving or is moving much less than normal.  Watch closely for changes in your health, and be sure to contact your doctor if you have any problems. Where can you learn more? Go to [email or web address]://chpepiceweb.[email or web address] org and sign in to your [employer] account. Enter G528 in the [employer] box to learn more about \"Counting Your Baby's Kicks: Care Instructions. \"     If you do not have an account, please click on the \"Sign Up Now\" link. Current as of: September 5, 2018  Content Version: 11.9  © 6891-9876 Spectraseis. Care instructions adapted under license by [facility] ([facility]). If you have questions about a medical condition or this instruction, always ask your healthcare professional. [address] any warranty or liability for your use of this information. Patient Education        High Blood Pressure in Pregnancy: Care Instructions  Your Care Instructions    High blood pressure (hypertension) means that the force of blood against your artery walls is too strong. Mild high blood pressure during pregnancy is not usually dangerous. Your doctor will probably just want to watch you closely. But when blood pressure is very high, it can reduce oxygen to your baby. This can affect how well your baby grows. High blood pressure also means that you are at higher risk for:  · Preeclampsia. This is a problem that includes high blood pressure and damage to your liver or kidneys. It can also reduce how much oxygen your baby gets. In some cases, it leads to eclampsia. Eclampsia causes seizures. · Placental abruption. This is a problem when the placenta separates from the uterus before birth. It prevents the baby from getting enough oxygen and nutrients. Sometimes it can cause death for the baby and the mother. To prevent problems for you or your baby, you will have to check your blood pressure often. You will do this until after your baby is born.   If your blood pressure rises suddenly or is very high during your think that you are in labor.     · You have pain in your belly or pelvis.    Watch closely for changes in your health, and be sure to contact your doctor if:    · You gain weight rapidly. Where can you learn more? Go to [email or web address]://chdel.healthStudio [email or web address] org and sign in to your [employer] account. Enter C508 in the [facility] box to learn more about \"High Blood Pressure in Pregnancy: Care Instructions. \"     If you do not have an account, please click on the \"Sign Up Now\" link. Current as of: 2018  Content Version: 11.9  © 0162-1720 [employer]. Care instructions adapted under license by [facility] ([facility]). If you have questions about a medical condition or this instruction, always ask your healthcare professional. [address] any warranty or liability for your use of this information. Patient Education        Home Blood Glucose Test: About This Test  What is it? A home blood glucose test measures the amount of a type of sugar, called glucose, in your blood. Why is this test done? People who have diabetes need to check the amount of glucose in their blood. A home blood glucose test is an easy way to test your blood at home or when you are away from home. The results help you know when to take action to keep your blood glucose levels in a target range. How can you prepare for the test?  · Check the expiration date on the bottle of testing strips. Do not use test strips that have . · Match the code number on the testing strips bottle with the number on the meter. If the numbers do not match, follow the directions with the meter for changing the code number. What happens before the test?  The supplies you will need for testing blood glucose include:  · A blood glucose meter. · Testing strips. These are made to be used with a specific model of meter. · Sugar control solutions. Some meters require a specific solution.  Many new meters are made to operate without a control solution. · Short needles called lancets for pricking your skin. · A pen-sized venegas for the lancet (lancet device), which positions the lancet and controls how deeply it goes into your skin. · Clean cotton balls. These are used to stop the bleeding from the testing site. What happens during the test?  A home blood glucose test involves pricking your finger, palm, or forearm with a lancet to collect a drop of blood. The blood drop is placed on a test strip, which you insert into the blood glucose meter. The instructions for testing are slightly different for each blood glucose meter model. Follow the instructions that came with your meter. · Wash your hands with warm, soapy water. Dry them well with a clean towel. You may also use an alcohol wipe to clean your finger or other site, but make sure your hands are dry before the test.  · Insert a clean lancet into the lancet device. · Remove a test strip from the test strip bottle. Replace the lid immediately to keep moisture away from the other strips. · Follow the instructions that came with your meter to get it ready. · Use the lancet device to stick the side of your fingertip with the lancet. Do not stick the tip of your finger. Some blood sugar meters use lancet devices that take the blood sample from other sites, such as the palm of the hand or the forearm. But the finger is usually the most accurate place to test blood sugar. · Put a drop of blood on the correct spot on the test strip. · Apply pressure with a clean cotton ball to stop the bleeding. · Follow the directions that came with the meter to get the results. · Write down the results and the time that you tested your blood. Some meters will store the results for you. What else should you know about the test?  The American Diabetes Association (ADA) recommends that you stay within the following blood glucose level ranges.  But depending on your health, you and your doctor may set a different range for you. For nonpregnant adults with diabetes:  · 80 milligrams per deciliter (mg/dL) to 130 mg/dL before a meal  · Less than 180 mg/dL 1 to 2 hours after a meal  For women who have diabetes related to pregnancy (gestational diabetes):  · 95 mg/dL or less before breakfast  · 120 to 140 mg/dL (or lower) 1 to 2 hours after a meal  How long does the test take? · The blood glucose meter will show the results of the test in a minute or less. Where can you learn more? Go to [email or web address]://[email or web address].[email or web address] org and sign in to your [employer] account. Enter V346 in the [employer] box to learn more about \"Home Blood Glucose Test: About This Test.\"     If you do not have an account, please click on the \"Sign Up Now\" link. Current as of: July 25, 2018  Content Version: 11.9  © 7400-9222 Xangati, Incorporated. Care instructions adapted under license by [facility] ([facility]). If you have questions about a medical condition or this instruction, always ask your healthcare professional. [place] [address] any warranty or liability for your use of this information.

## 2019-04-16 NOTE — PROGRESS NOTES
19     RE:  Kim Webb   : 1985   AGE: 26 y.o. REFERRING PHYSICIAN:  Bard Wilmar MD    Dear   Mrs. Kim Webb a 26 y.o.  [id number]  is seen today on follow up in our office. REASON FOR APPOINTMENT:    · Follow up on a pregnant patient with morbid obesity, positive screen for gestational diabetes one hour GTT, status post gastric bypass surgery, advanced maternal age, essential hypertension, and first trimester exposure to an ACE inhibitor. MEDICATIONS:    Prenatal Vitamins once per day   Ferrous sulfate 325 mg by mouth per day. Labetalol 100 mg by mouth 3 times a day. Carafate 500 mg by mouth every three hours. Prilosec 20 mg by mouth per day. Flonase for treatment of sinus problems    INTERVAL HISTORY:  Mrs Kim Webb had an uneventful course of pregnancy since her last visit to our office. When seen today in our office she had no complaints. PHYSICAL EXAMINATION:  General Appearance:  Healthy looking, alert, no acute distress. Eyes:     No pallor, no icterus, no photophobia. Ears:     No ear drainage. Nose:     No nasal drainage, no paranasal sinus tenderness. Throat:   Mucosa moist, no oral thrush, no exudate. Neck:     No nuchal rigidity. Back:     No CVA tenderness. Abdomen:    Soft nontender. Extremities:    No pretibial pitting edema, no calf muscle tenderness. Skin:     No rashes, no lesions. BP: 139/84 Weight: (!) 336 lb (152.4 kg) Height: 5' 1\" (154.9 cm) Pulse: 79     Body mass index is 63.49 kg/m². Urine dipstick:  Glucose : Negative   Albumin:  Negative       An ultrasound evaluation was done in our office today. Please refer to the enclosed copy of the ultrasound report for further information. Chart and Lab Work Review:  Review of her log book shows: Adequate sugar control with fasting sugars under 92 and 2hr pp under 120 mg/dl. IMPRESSION:  1. A  32w3d  intrauterine gestation.    2. Advanced maternal age.  3. Elevated one hour 50 g Glucola test on 3/9/2019.  4. Could not tolerate the three-hour glucose tolerance test in your office (threw up)  5. Essential hypertension. 6. Morbid obesity. 7. Previous  delivery  8. First trimester exposure to ACE inhibitor lisinopril. 9. Status post gastric bypass surgery. RECOMMENDATIONS/PLAN:  I discussed with the patient the following points:    1. The benefits and limitations of ultrasound in prenatal diagnosis. Some defects might not always be seen by ultrasound. Estimated incidence of these defects in the general population is 2- 4%. 2. No structural  anomalies are noted. Only genetic amniocentesis can rule out fetal chromosome anomalies. Normal ultrasound does not. 3. The size of her baby is appropriate for gestational age. 4. Her blood pressures are well controlled with the present dose of Labetalol. Essential hypertension is associated with an increased risk of developing intrauterine growth restriction and an increased risk of developing superimposed preeclampsia. The systolic should be kept under 020, diastolic under 117.  5. Her sugars are well controlled. I recommend that she tests her sugars fasting and two hour following each meal for three days prior to her visits to our office and that she brings of her log book for review to our office every visit. 6. Fetal well-being was confirmed today. The amount of fluid around baby is normal.  The Biophysical profile score of 8/10 is reassuring, and the umbilical artery Doppler studies are normal.  7. She should monitor fetal well-being at home by counting movements after dinner. Her baby should  move 10 times in 2 hours; otherwise, she should call your office immediately. She is also to call, if she develops any headaches, blurred vision, abdominal pain, bleeding, or spotting, which are signs of preeclampsia.      8. She is to continue to follow with you in your office for ongoing obstetric

## 2019-04-19 ENCOUNTER — ROUTINE PRENATAL (OUTPATIENT)
Dept: OBGYN CLINIC | Age: 34
End: 2019-04-19
Payer: COMMERCIAL

## 2019-04-19 VITALS
BODY MASS INDEX: 64.24 KG/M2 | WEIGHT: 293 LBS | DIASTOLIC BLOOD PRESSURE: 82 MMHG | HEART RATE: 81 BPM | SYSTOLIC BLOOD PRESSURE: 139 MMHG

## 2019-04-19 DIAGNOSIS — O99.213 MATERNAL MORBID OBESITY IN THIRD TRIMESTER, ANTEPARTUM (HCC): ICD-10-CM

## 2019-04-19 DIAGNOSIS — O10.013 ESSENTIAL HYPERTENSION AFFECTING PREGNANCY IN THIRD TRIMESTER: Primary | ICD-10-CM

## 2019-04-19 DIAGNOSIS — Z3A.32 32 WEEKS GESTATION OF PREGNANCY: ICD-10-CM

## 2019-04-19 DIAGNOSIS — E66.01 MATERNAL MORBID OBESITY IN THIRD TRIMESTER, ANTEPARTUM (HCC): ICD-10-CM

## 2019-04-19 DIAGNOSIS — O99.843 PREVIOUS GASTRIC BYPASS AFFECTING PREGNANCY IN THIRD TRIMESTER, ANTEPARTUM: ICD-10-CM

## 2019-04-19 DIAGNOSIS — O09.523 ELDERLY MULTIGRAVIDA, THIRD TRIMESTER: ICD-10-CM

## 2019-04-19 LAB
GLUCOSE URINE, POC: NEGATIVE
PROTEIN UA: NEGATIVE

## 2019-04-19 PROCEDURE — 99211 OFF/OP EST MAY X REQ PHY/QHP: CPT | Performed by: OBSTETRICS & GYNECOLOGY

## 2019-04-19 PROCEDURE — 81002 URINALYSIS NONAUTO W/O SCOPE: CPT | Performed by: OBSTETRICS & GYNECOLOGY

## 2019-04-19 PROCEDURE — 59025 FETAL NON-STRESS TEST: CPT | Performed by: OBSTETRICS & GYNECOLOGY

## 2019-04-19 PROCEDURE — 99999 PR OFFICE/OUTPT VISIT,PROCEDURE ONLY: CPT | Performed by: OBSTETRICS & GYNECOLOGY

## 2019-04-19 NOTE — PATIENT INSTRUCTIONS
Patient Education        Weeks 32 to 29 of Your Pregnancy: Care Instructions  Your Care Instructions    During the last few weeks of your pregnancy, you may have more aches and pains. It's important to rest when you can. Your growing baby is putting more pressure on your bladder. So you may need to urinate more often. Hemorrhoids are also common. These are painful, itchy veins in the rectal area. In the 36th week, most women have a test for group B streptococcus (GBS). GBS is a common bacteria that can live in the vagina and rectum. It can make your baby sick after birth. If you test positive, you will get antibiotics during labor. These will keep your baby from getting the bacteria. You may want to talk with your doctor about banking your baby's umbilical cord blood. This is the blood left in the cord after birth. If you want to save this blood, you must arrange it ahead of time. You can't decide at the last minute. If you haven't already had the Tdap shot during this pregnancy, talk to your doctor about getting it. It will help protect your  against pertussis infection. Follow-up care is a key part of your treatment and safety. Be sure to make and go to all appointments, and call your doctor if you are having problems. It's also a good idea to know your test results and keep a list of the medicines you take. How can you care for yourself at home? Ease hemorrhoids  · Get more liquids, fruits, vegetables, and fiber in your diet. This will help keep your stools soft. · Avoid sitting for too long. Lie on your left side several times a day. · Clean yourself with soft, moist toilet paper. Or you can use witch hazel pads or personal hygiene pads. · If you are uncomfortable, try ice packs. Or you can sit in a warm sitz bath. Do these for 20 minutes at a time, as needed. · Use hydrocortisone cream for pain and itching. Two examples are Anusol and Preparation H Hydrocortisone.   · Ask your doctor about taking an over-the-counter stool softener. Consider breastfeeding  · Experts recommend that women breastfeed for 1 year or longer. Breast milk is the perfect food for babies. · Breast milk is easier for babies to digest than formula. And it is always available, just the right temperature, and free. · Breast milk may help protect your child from some health problems.  babies are less likely than formula-fed babies to:  ? Get ear infections, colds, diarrhea, and pneumonia. ? Be obese or get diabetes later in life. · Women who breastfeed have less bleeding after the birth. Their uteruses also shrink back faster. · Some women who breastfeed lose weight faster. Making milk burns calories. · Breastfeeding can lower your risk of breast cancer, ovarian cancer, and osteoporosis. Decide about circumcision for boys  · As you make this decision, it may help to think about your personal, Congregation, and family traditions. You get to decide if you will keep your son's penis natural or if he will be circumcised. · If you decide that you would like to have your baby circumcised, talk with your doctor. You can share your concerns about pain. And you can discuss your preferences for anesthesia. Where can you learn more? Go to https://ASSET4pepicMobisante.healthWestward Leaning. org and sign in to your [employer] account. Enter X070 in the Squidbid box to learn more about \"Weeks 32 to 34 of Your Pregnancy: Care Instructions. \"     If you do not have an account, please click on the \"Sign Up Now\" link. Current as of: September 5, 2018  Content Version: 11.9  © 2529-8987 Gnip, Incorporated. Care instructions adapted under license by [facility] [place] ([facility]). If you have questions about a medical condition or this instruction, always ask your healthcare professional. [place] [address] any warranty or liability for your use of this information.          Patient Education        Learning About When to Call Your have other concerns about your pregnancy. If you have labor signs at 37 weeks or more  If you have signs of labor at 37 weeks or more, your doctor may tell you to call when your labor becomes more active. Symptoms of active labor include:  · Contractions that are regular. · Contractions that are less than 5 minutes apart. · Contractions that are hard to talk through. Follow-up care is a key part of your treatment and safety. Be sure to make and go to all appointments, and call your doctor if you are having problems. It's also a good idea to know your test results and keep a list of the medicines you take. Where can you learn more? Go to https://ActionTax.capepiceweb.Intern. org and sign in to your Taxizu account. Enter  in the JumpLinc box to learn more about \"Learning About When to Call Your Doctor During Pregnancy (After 20 Weeks). \"     If you do not have an account, please click on the \"Sign Up Now\" link. Current as of: September 5, 2018  Content Version: 11.9  © 8910-2224 [employer]. Care instructions adapted under license by [facility] ([facility]). If you have questions about a medical condition or this instruction, always ask your healthcare professional. [place] [address] any warranty or liability for your use of this information. Patient Education        Counting Your Baby's Kicks: Care Instructions  Your Care Instructions    Counting your baby's kicks is one way your doctor can tell that your baby is healthy. Most women--especially in a first pregnancy--feel their baby move for the first time between 16 and 22 weeks. The movement may feel like flutters rather than kicks. Your baby may move more at certain times of the day. When you are active, you may notice less kicking than when you are resting. At your prenatal visits, your doctor will ask whether the baby is active.   In your last trimester, your doctor may ask you to count the number of times you feel your baby move. Follow-up care is a key part of your treatment and safety. Be sure to make and go to all appointments, and call your doctor if you are having problems. It's also a good idea to know your test results and keep a list of the medicines you take. How do you count fetal kicks? · A common method of checking your baby's movement is to count the number of kicks or moves you feel in 1 hour. Ten movements (such as kicks, flutters, or rolls) in 1 hour are normal. Some doctors suggest that you count in the morning until you get to 10 movements. Then you can quit for that day and start again the next day. · Pick your baby's most active time of day to count. This may be any time from morning to evening. · If you do not feel 10 movements in an hour, your baby may be sleeping. Wait for the next hour and count again. When should you call for help? Call your doctor now or seek immediate medical care if:    · You noticed that your baby has stopped moving or is moving much less than normal.    Watch closely for changes in your health, and be sure to contact your doctor if you have any problems. Where can you learn more? Go to https://Larotec.Oxford Photovoltaics. org and sign in to your hyaqu account. Enter X934 in the Orugga box to learn more about \"Counting Your Baby's Kicks: Care Instructions. \"     If you do not have an account, please click on the \"Sign Up Now\" link. Current as of: September 5, 2018  Content Version: 11.9  © 2694-9800 i.Meter, Incorporated. Care instructions adapted under license by [facility] ([facility]). If you have questions about a medical condition or this instruction, always ask your healthcare professional. [place] [address] any warranty or liability for your use of this information.

## 2019-04-19 NOTE — PROGRESS NOTES
NON STRESS TEST INTERPRETATION    19    RE:  Myriam Roberson   : 1985   AGE: 26 y.o. GESTATIONAL AGE:  32w6d    DIAGNOSIS:     Essential hypertension. Advanced maternal age. Post gastric bypass. Morbid obesity. INDICATION:  Essential hypertension.       TIME ON:  8:21 AM      TIME OFF:  8:49 AM      RESULT:   REACTIVE      FHR Baseline Rate:   140 bpm    PERIODIC CHANGES:    · Accelerations present, variability moderate, no decelerations noted    COMMENTS:      She is to continue having NST's every 3-4 days, and BPP with umbilical artery doppler studies once per week        Dayo Eldridge MD

## 2019-04-19 NOTE — PROGRESS NOTES
Patient states baby is moving  Denies bleeding, leakage of fluid or contractions    NST started @08:20  Mom Marking fetal movement   with moderate variability  Dr Abiola Alamo reviewed NST reactive, ended @ 08:47  Call your obstetrician for:    Bleeding, leakage of fluid, abdominal tenderness, headaches, burred vision or  epigastric pain or decreased fetal movement or increased in urinary frequency.    Call if any questions or concerns

## 2019-04-23 ENCOUNTER — ROUTINE PRENATAL (OUTPATIENT)
Dept: OBGYN CLINIC | Age: 34
End: 2019-04-23
Payer: COMMERCIAL

## 2019-04-23 VITALS
HEART RATE: 75 BPM | WEIGHT: 293 LBS | DIASTOLIC BLOOD PRESSURE: 74 MMHG | SYSTOLIC BLOOD PRESSURE: 120 MMHG | BODY MASS INDEX: 55.32 KG/M2 | HEIGHT: 61 IN

## 2019-04-23 DIAGNOSIS — O99.213 MATERNAL MORBID OBESITY IN THIRD TRIMESTER, ANTEPARTUM (HCC): ICD-10-CM

## 2019-04-23 DIAGNOSIS — E66.01 MATERNAL MORBID OBESITY IN THIRD TRIMESTER, ANTEPARTUM (HCC): ICD-10-CM

## 2019-04-23 DIAGNOSIS — O10.013 ESSENTIAL HYPERTENSION AFFECTING PREGNANCY IN THIRD TRIMESTER: Primary | ICD-10-CM

## 2019-04-23 DIAGNOSIS — O09.523 ELDERLY MULTIGRAVIDA, THIRD TRIMESTER: ICD-10-CM

## 2019-04-23 DIAGNOSIS — O99.843 PREVIOUS GASTRIC BYPASS AFFECTING PREGNANCY IN THIRD TRIMESTER, ANTEPARTUM: ICD-10-CM

## 2019-04-23 DIAGNOSIS — Z3A.33 33 WEEKS GESTATION OF PREGNANCY: ICD-10-CM

## 2019-04-23 LAB
GLUCOSE URINE, POC: NEGATIVE
PROTEIN UA: NEGATIVE

## 2019-04-23 PROCEDURE — 76820 UMBILICAL ARTERY ECHO: CPT | Performed by: OBSTETRICS & GYNECOLOGY

## 2019-04-23 PROCEDURE — 99213 OFFICE O/P EST LOW 20 MIN: CPT | Performed by: OBSTETRICS & GYNECOLOGY

## 2019-04-23 PROCEDURE — 76818 FETAL BIOPHYS PROFILE W/NST: CPT | Performed by: OBSTETRICS & GYNECOLOGY

## 2019-04-23 PROCEDURE — 99211 OFF/OP EST MAY X REQ PHY/QHP: CPT | Performed by: OBSTETRICS & GYNECOLOGY

## 2019-04-23 PROCEDURE — 81002 URINALYSIS NONAUTO W/O SCOPE: CPT | Performed by: OBSTETRICS & GYNECOLOGY

## 2019-04-23 PROCEDURE — 76815 OB US LIMITED FETUS(S): CPT | Performed by: OBSTETRICS & GYNECOLOGY

## 2019-04-23 NOTE — PROGRESS NOTES
No c/o. Good fetal movement. Kick counts encouraged. Denies bleeding,lof,ctx,headaches,visual issues,epigastric discomfort.  All questions answered+ information confirmed by pt

## 2019-04-23 NOTE — PROGRESS NOTES
19     RE:  James Negron   : 1985         AGE: 26 y.o. REFERRING PHYSICIAN:  Valery Weiss MD    Dear   Mrs. James Negron a 26 y.o.  [id number]  is seen today on follow up in our office. REASON FOR APPOINTMENT:    · Follow up on a pregnant patient with morbid obesity, positive screen for gestational diabetes one hour GTT, status post gastric bypass surgery, advanced maternal age, essential hypertension, and first trimester exposure to an ACE inhibitor. MEDICATIONS:    Prenatal Vitamins once per day   Ferrous sulfate 325 mg by mouth per day. Labetalol 100 mg by mouth 3 times a day. Carafate 500 mg by mouth every three hours. Prilosec 20 mg by mouth per day. Flonase for treatment of sinus problems    INTERVAL HISTORY:  Mrs James Negron had an uneventful course of pregnancy since her last visit to our office. When seen today in our office she had no complaints. PHYSICAL EXAMINATION:  General Appearance:  Healthy looking, alert, no acute distress. Eyes:     No pallor, no icterus, no photophobia. Ears:     No ear drainage. Nose:     No nasal drainage, no paranasal sinus tenderness. Throat:   Mucosa moist, no oral thrush, no exudate. Neck:     No nuchal rigidity. Back:     No CVA tenderness. Abdomen:    Soft nontender. Extremities:    No pretibial pitting edema, no calf muscle tenderness. Skin:     No rashes, no lesions. BP: (!) 141/82, repeated BP: 120/74 Weight: (!) 340 lb (154.2 kg) Height: 5' 1\" (154.9 cm) Pulse: 75     Body mass index is 64.24 kg/m². Urine dipstick:  Glucose : Negative   Albumin:  Negative       An ultrasound evaluation was done in our office today. Please refer to the enclosed copy of the ultrasound report for further information. IMPRESSION:  1. A  33w3d  intrauterine gestation. 2. Advanced maternal age. 3. Polyhydramnios. 4. Excessive fetal growth  5. Elevated one hour 50 g Glucola test on 3/9/2019.   6. Could not please do not hesitate to call. Sincerely,        Divine Hernández M.D., [address]    Current encounter billing:  NE OFFICE OUTPATIENT VISIT 15 MINUTES [81360]  US OB 1 or More Fetus Limited [09099 Custom]  Biophysical profile [OBO12 Custom]  US Doppler Fetal Umbilical Artery [HCC8866 Custom]    **This report has been created using voice recognition software. It may contain minor errors     which are inherent in voice recognition technology**                  NON STRESS TEST INTERPRETATION    19    RE:  Kirsty Wheat   : 1985   AGE: 26 y.o. GESTATIONAL AGE:  33w3d    DIAGNOSIS:     Essential hypertension. Advanced maternal age. Post gastric bypass. Morbid obesity. INDICATION:  Essential hypertension.       TIME ON:  9:58 AM      TIME OFF:  10:20 AM      RESULT:   REACTIVE      FHR Baseline Rate:   145 bpm    PERIODIC CHANGES:    · Accelerations present, variability moderate, no decelerations noted    COMMENTS:      She is to continue having NST's every 3-4 days, and BPP with umbilical artery doppler studies once per week        Radha Dill MD

## 2019-04-23 NOTE — LETTER
19     RE:  Carleene Lombard   : 1985         AGE: 26 y.o. REFERRING PHYSICIAN:  Eric Asher MD    Dear   Mrs. Carleene Lombard a 26 y.o.  [id number]  is seen today on follow up in our office. REASON FOR APPOINTMENT:    · Follow up on a pregnant patient with morbid obesity, positive screen for gestational diabetes one hour GTT, status post gastric bypass surgery, advanced maternal age, essential hypertension, and first trimester exposure to an ACE inhibitor. MEDICATIONS:    Prenatal Vitamins once per day   Ferrous sulfate 325 mg by mouth per day. Labetalol 100 mg by mouth 3 times a day. Carafate 500 mg by mouth every three hours. Prilosec 20 mg by mouth per day. Flonase for treatment of sinus problems    INTERVAL HISTORY:  Mrs Carleene Lombard had an uneventful course of pregnancy since her last visit to our office. When seen today in our office she had no complaints. PHYSICAL EXAMINATION:  General Appearance:  Healthy looking, alert, no acute distress. Eyes:     No pallor, no icterus, no photophobia. Ears:     No ear drainage. Nose:     No nasal drainage, no paranasal sinus tenderness. Throat:   Mucosa moist, no oral thrush, no exudate. Neck:     No nuchal rigidity. Back:     No CVA tenderness. Abdomen:    Soft nontender. Extremities:    No pretibial pitting edema, no calf muscle tenderness. Skin:     No rashes, no lesions. BP: (!) 141/82, repeated BP: 120/74 Weight: (!) 340 lb (154.2 kg) Height: 5' 1\" (154.9 cm) Pulse: 75     Body mass index is 64.24 kg/m². Urine dipstick:  Glucose : Negative   Albumin:  Negative       An ultrasound evaluation was done in our office today. Please refer to the enclosed copy of the ultrasound report for further information. IMPRESSION:  1. A  33w3d  intrauterine gestation. 2. Advanced maternal age. 3. Polyhydramnios. 4. Excessive fetal growth  5. Elevated one hour 50 g Glucola test on 3/9/2019.

## 2019-04-23 NOTE — LETTER
NON STRESS TEST INTERPRETATION    19    RE:  Radha Cunningham   : 1985   AGE: 26 y.o. GESTATIONAL AGE:  33w3d    DIAGNOSIS:     Essential hypertension. Advanced maternal age. Post gastric bypass. Morbid obesity. INDICATION:  Essential hypertension.       TIME ON:  9:58 AM      TIME OFF:  10:20 AM      RESULT:   REACTIVE      FHR Baseline Rate:   145 bpm    PERIODIC CHANGES:    · Accelerations present, variability moderate, no decelerations noted    COMMENTS:      She is to continue having NST's every 3-4 days, and BPP with umbilical artery doppler studies once per week        Sandra Sethi MD

## 2019-04-26 ENCOUNTER — ROUTINE PRENATAL (OUTPATIENT)
Dept: OBGYN CLINIC | Age: 34
End: 2019-04-26
Payer: COMMERCIAL

## 2019-04-26 VITALS
HEART RATE: 83 BPM | WEIGHT: 293 LBS | BODY MASS INDEX: 55.32 KG/M2 | SYSTOLIC BLOOD PRESSURE: 137 MMHG | HEIGHT: 61 IN | DIASTOLIC BLOOD PRESSURE: 84 MMHG

## 2019-04-26 DIAGNOSIS — O99.213 MATERNAL MORBID OBESITY IN THIRD TRIMESTER, ANTEPARTUM (HCC): ICD-10-CM

## 2019-04-26 DIAGNOSIS — O10.013 ESSENTIAL HYPERTENSION AFFECTING PREGNANCY IN THIRD TRIMESTER: Primary | ICD-10-CM

## 2019-04-26 DIAGNOSIS — O99.843 PREVIOUS GASTRIC BYPASS AFFECTING PREGNANCY IN THIRD TRIMESTER, ANTEPARTUM: ICD-10-CM

## 2019-04-26 DIAGNOSIS — O09.523 ELDERLY MULTIGRAVIDA, THIRD TRIMESTER: ICD-10-CM

## 2019-04-26 DIAGNOSIS — Z3A.33 33 WEEKS GESTATION OF PREGNANCY: ICD-10-CM

## 2019-04-26 DIAGNOSIS — E66.01 MATERNAL MORBID OBESITY IN THIRD TRIMESTER, ANTEPARTUM (HCC): ICD-10-CM

## 2019-04-26 LAB
GLUCOSE URINE, POC: NORMAL
PROTEIN UA: NEGATIVE

## 2019-04-26 PROCEDURE — 99999 PR OFFICE/OUTPT VISIT,PROCEDURE ONLY: CPT | Performed by: OBSTETRICS & GYNECOLOGY

## 2019-04-26 PROCEDURE — 81002 URINALYSIS NONAUTO W/O SCOPE: CPT | Performed by: OBSTETRICS & GYNECOLOGY

## 2019-04-26 PROCEDURE — 59025 FETAL NON-STRESS TEST: CPT | Performed by: OBSTETRICS & GYNECOLOGY

## 2019-04-26 PROCEDURE — 99211 OFF/OP EST MAY X REQ PHY/QHP: CPT | Performed by: OBSTETRICS & GYNECOLOGY

## 2019-04-26 NOTE — PATIENT INSTRUCTIONS
You might be having an NST at your next appt. Please eat a large snack or breakfast before coming to office. Thank youYou might be having an NST at your next appt. Please eat a large snack or breakfast before coming to office. Thank youCall your primary obstetrician with bleeding, leaking of fluid, abdominal tenderness, headache, blurry vision, epigastric pain and increased urinary frequency. Any questions contact [facility] at [phone]. If you are experiencing an emergency and need immediate help, call 911 or go to go emergency room or labor and delivery. Do kick counts after dinner. Call your primary obstetrician if less than 10 kicks in 2 hours after dinner. Call your primary obstetrician with bleeding, leaking of fluid, abdominal tenderness, headache, blurry vision, epigastric pain and increased urinary frequency. if you are sick, not feeling well or have an infectious process going on please reschedule your appointment by calling [phone]. Also if any family members are not feeling well, please do not bring them to your appointment. We appreciate your cooperation. We are doing this in order to protect our pregnant mothers+ their babies. Patient Education        Weeks 32 to 29 of Your Pregnancy: Care Instructions  Your Care Instructions    During the last few weeks of your pregnancy, you may have more aches and pains. It's important to rest when you can. Your growing baby is putting more pressure on your bladder. So you may need to urinate more often. Hemorrhoids are also common. These are painful, itchy veins in the rectal area. In the 36th week, most women have a test for group B streptococcus (GBS). GBS is a common bacteria that can live in the vagina and rectum. It can make your baby sick after birth. If you test positive, you will get antibiotics during labor. These will keep your baby from getting the bacteria. You may want to talk with your doctor about banking your baby's umbilical cord blood. This is the blood left in the cord after birth. If you want to save this blood, you must arrange it ahead of time. You can't decide at the last minute. If you haven't already had the Tdap shot during this pregnancy, talk to your doctor about getting it. It will help protect your  against pertussis infection. Follow-up care is a key part of your treatment and safety. Be sure to make and go to all appointments, and call your doctor if you are having problems. It's also a good idea to know your test results and keep a list of the medicines you take. How can you care for yourself at home? Ease hemorrhoids  · Get more liquids, fruits, vegetables, and fiber in your diet. This will help keep your stools soft. · Avoid sitting for too long. Lie on your left side several times a day. · Clean yourself with soft, moist toilet paper. Or you can use witch hazel pads or personal hygiene pads. · If you are uncomfortable, try ice packs. Or you can sit in a warm sitz bath. Do these for 20 minutes at a time, as needed. · Use hydrocortisone cream for pain and itching. Two examples are Anusol and Preparation H Hydrocortisone. · Ask your doctor about taking an over-the-counter stool softener. Consider breastfeeding  · Experts recommend that women breastfeed for 1 year or longer. Breast milk is the perfect food for babies. · Breast milk is easier for babies to digest than formula. And it is always available, just the right temperature, and free. · Breast milk may help protect your child from some health problems.  babies are less likely than formula-fed babies to:  ? Get ear infections, colds, diarrhea, and pneumonia. ? Be obese or get diabetes later in life. · Women who breastfeed have less bleeding after the birth. Their uteruses also shrink back faster. · Some women who breastfeed lose weight faster. Making milk burns calories.   · Breastfeeding can lower your risk of breast cancer, ovarian cancer, and osteoporosis. Decide about circumcision for boys  · As you make this decision, it may help to think about your personal, Lutheran, and family traditions. You get to decide if you will keep your son's penis natural or if he will be circumcised. · If you decide that you would like to have your baby circumcised, talk with your doctor. You can share your concerns about pain. And you can discuss your preferences for anesthesia. Where can you learn more? Go to https://Liquidations Enchere LimitedpeBee On The Go.ZenPayroll. org and sign in to your LAFASO account. Enter R851 in the LawnStarter box to learn more about \"Weeks 32 to 34 of Your Pregnancy: Care Instructions. \"     If you do not have an account, please click on the \"Sign Up Now\" link. Current as of: September 5, 2018  Content Version: 11.9  © 2189-0533 LoveByte, DataRPM. Care instructions adapted under license by [facility] ([facility]). If you have questions about a medical condition or this instruction, always ask your healthcare professional. [place] [address] any warranty or liability for your use of this information. Patient Education        Learning About When to Call Your Doctor During Pregnancy (After 20 Weeks)  Your Care Instructions  It's common to have concerns about what might be a problem during pregnancy. Although most pregnant women don't have any serious problems, it's important to know when to call your doctor if you have certain symptoms or signs of labor. These are general suggestions. Your doctor may give you some more information about when to call. When to call your doctor (after 20 weeks)  Call 911 anytime you think you may need emergency care. For example, call if:  · You have severe vaginal bleeding. · You have sudden, severe pain in your belly. · You passed out (lost consciousness). · You have a seizure. · You see or feel the umbilical cord.   · You think you are about to deliver your baby and can't make it safely to the hospital.  Call your doctor now or seek immediate medical care if:  · You have vaginal bleeding. · You have belly pain. · You have a fever. · You have symptoms of preeclampsia, such as:  ? Sudden swelling of your face, hands, or feet. ? New vision problems (such as dimness or blurring). ? A severe headache. · You have a sudden release of fluid from your vagina. (You think your water broke.)  · You think that you may be in labor. This means that you've had at least 4 contractions within 20 minutes or at least 8 contractions in an hour. · You notice that your baby has stopped moving or is moving much less than normal.  · You have symptoms of a urinary tract infection. These may include:  ? Pain or burning when you urinate. ? A frequent need to urinate without being able to pass much urine. ? Pain in the flank, which is just below the rib cage and above the waist on either side of the back. ? Blood in your urine. Watch closely for changes in your health, and be sure to contact your doctor if:  · You have vaginal discharge that smells bad. · You have skin changes, such as:  ? A rash. ? Itching. ? Yellow color to your skin. · You have other concerns about your pregnancy. If you have labor signs at 37 weeks or more  If you have signs of labor at 37 weeks or more, your doctor may tell you to call when your labor becomes more active. Symptoms of active labor include:  · Contractions that are regular. · Contractions that are less than 5 minutes apart. · Contractions that are hard to talk through. Follow-up care is a key part of your treatment and safety. Be sure to make and go to all appointments, and call your doctor if you are having problems. It's also a good idea to know your test results and keep a list of the medicines you take. Where can you learn more? Go to https://chdel.Theater for the Arts. org and sign in to your 908 Devices account.  Enter  in the Ohmx box to learn more about \"Learning About When to Call Your Doctor During Pregnancy (After 20 Weeks). \"     If you do not have an account, please click on the \"Sign Up Now\" link. Current as of: September 5, 2018  Content Version: 11.9  © 5740-2601 OfferSavvy, Burt. Care instructions adapted under license by [facility] ([facility]). If you have questions about a medical condition or this instruction, always ask your healthcare professional. [address] any warranty or liability for your use of this information. Patient Education        High Blood Pressure in Pregnancy: Care Instructions  Your Care Instructions    High blood pressure (hypertension) means that the force of blood against your artery walls is too strong. Mild high blood pressure during pregnancy is not usually dangerous. Your doctor will probably just want to watch you closely. But when blood pressure is very high, it can reduce oxygen to your baby. This can affect how well your baby grows. High blood pressure also means that you are at higher risk for:  · Preeclampsia. This is a problem that includes high blood pressure and damage to your liver or kidneys. It can also reduce how much oxygen your baby gets. In some cases, it leads to eclampsia. Eclampsia causes seizures. · Placental abruption. This is a problem when the placenta separates from the uterus before birth. It prevents the baby from getting enough oxygen and nutrients. Sometimes it can cause death for the baby and the mother. To prevent problems for you or your baby, you will have to check your blood pressure often. You will do this until after your baby is born. If your blood pressure rises suddenly or is very high during your pregnancy, your doctor may prescribe medicines. They can usually control blood pressure. If your blood pressure affects your or your baby's health, your doctor may need to deliver your baby early.  After your baby is born, your blood pressure will probably improve. But sometimes blood pressure problems continue after birth. Follow-up care is a key part of your treatment and safety. Be sure to make and go to all appointments, and call your doctor if you are having problems. It's also a good idea to know your test results and keep a list of the medicines you take. How can you care for yourself at home? · Take and write down your blood pressure at home if your doctor tells you to. · Take your medicines exactly as prescribed. Call your doctor if you think you are having a problem with your medicine. · Do not smoke. If you need help quitting, talk to your doctor about stop-smoking programs and medicines. These can increase your chances of quitting for good. · Do not gain too much weight during your pregnancy. Talk to your doctor about how much weight gain is healthy. · Eat a healthy diet. · If your doctor says it's okay, get regular exercise. Walking or swimming several times a week can be healthy for you and your baby. · Reduce stress, and find time to relax. This is very important if you continue to work or have a busy schedule. It's also important if you have small children at home. When should you call for help? Call 911 anytime you think you may need emergency care. For example, call if:    · You passed out (lost consciousness).     · You have a seizure.    Call your doctor now or seek immediate medical care if:    · You have symptoms of preeclampsia, such as:  ? Sudden swelling of your face, hands, or feet. ? New vision problems (such as dimness or blurring). ? A severe headache.     · Your blood pressure is higher than it should be or rises suddenly.     · You have new nausea or vomiting.     · You think that you are in labor.     · You have pain in your belly or pelvis.    Watch closely for changes in your health, and be sure to contact your doctor if:    · You gain weight rapidly. Where can you learn more?   Go to https://chpepiceweb.healthYouLike. org and sign in to your dynaTrace softwaret account. Enter R185 in the [place] box to learn more about \"High Blood Pressure in Pregnancy: Care Instructions. \"     If you do not have an account, please click on the \"Sign Up Now\" link. Current as of: September 5, 2018  Content Version: 11.9  © 1143-1824 Inuvo, [employer]well [employer]. Care instructions adapted under license by [facility] ([facility]). If you have questions about a medical condition or this instruction, always ask your healthcare professional. [address] any warranty or liability for your use of this information.

## 2019-04-26 NOTE — LETTER
NON STRESS TEST INTERPRETATION    19    RE:  Priyank Beltran   : 1985   AGE: 26 y.o. GESTATIONAL AGE:  33w6d    DIAGNOSIS:     Essential hypertension. Advanced maternal age. Post gastric bypass. Morbid obesity. INDICATION:  Essential hypertension.       TIME ON:  8:15 AM      TIME OFF:  8:57 AM      RESULT:   REACTIVE      FHR Baseline Rate:   140 bpm    PERIODIC CHANGES:    · Accelerations present, variability moderate, no decelerations noted    COMMENTS:      She is to continue having NST's every 3-4 days, and BPP with umbilical artery doppler studies once per week        Lorenzo Watson MD    Current encounter billing:  [place] OFFICE/OUTPT VISIT,PROCEDURE ONLY [[id number] [place] FETAL NON-STRESS TEST

## 2019-04-26 NOTE — PROGRESS NOTES
NST completed. Baseline 140 with moderate variability+ accelelrations.  Reactive per dr Juma Johnston

## 2019-04-26 NOTE — PROGRESS NOTES
NON STRESS TEST INTERPRETATION    19    RE:  Rajiv Alvarez   : 1985   AGE: 26 y.o. GESTATIONAL AGE:  33w6d    DIAGNOSIS:     Essential hypertension. Advanced maternal age. Post gastric bypass. Morbid obesity. INDICATION:  Essential hypertension.       TIME ON:  8:15 AM      TIME OFF:  8:57 AM      RESULT:   REACTIVE      FHR Baseline Rate:   140 bpm    PERIODIC CHANGES:    · Accelerations present, variability moderate, no decelerations noted    COMMENTS:      She is to continue having NST's every 3-4 days, and BPP with umbilical artery doppler studies once per week        Cristóbal Grider MD    Current encounter billing:  [place] OFFICE/OUTPT VISIT,PROCEDURE ONLY [[id number] [place] FETAL NON-STRESS TEST

## 2019-04-30 ENCOUNTER — ROUTINE PRENATAL (OUTPATIENT)
Dept: OBGYN CLINIC | Age: 34
End: 2019-04-30
Payer: COMMERCIAL

## 2019-04-30 VITALS
WEIGHT: 293 LBS | BODY MASS INDEX: 55.32 KG/M2 | SYSTOLIC BLOOD PRESSURE: 132 MMHG | DIASTOLIC BLOOD PRESSURE: 80 MMHG | HEIGHT: 61 IN | HEART RATE: 79 BPM

## 2019-04-30 DIAGNOSIS — E66.01 MATERNAL MORBID OBESITY IN THIRD TRIMESTER, ANTEPARTUM (HCC): ICD-10-CM

## 2019-04-30 DIAGNOSIS — O99.843 PREVIOUS GASTRIC BYPASS AFFECTING PREGNANCY IN THIRD TRIMESTER, ANTEPARTUM: ICD-10-CM

## 2019-04-30 DIAGNOSIS — O35.5XX0 SUSPECTED DAMAGE TO FETUS FROM DRUGS, AFFECTING MANAGEMENT OF MOTHER, SINGLE OR UNSPECIFIED FETUS: ICD-10-CM

## 2019-04-30 DIAGNOSIS — Z3A.34 34 WEEKS GESTATION OF PREGNANCY: ICD-10-CM

## 2019-04-30 DIAGNOSIS — O99.213 MATERNAL MORBID OBESITY IN THIRD TRIMESTER, ANTEPARTUM (HCC): ICD-10-CM

## 2019-04-30 DIAGNOSIS — O10.013 ESSENTIAL HYPERTENSION AFFECTING PREGNANCY IN THIRD TRIMESTER: Primary | ICD-10-CM

## 2019-04-30 DIAGNOSIS — O09.523 ELDERLY MULTIGRAVIDA, THIRD TRIMESTER: ICD-10-CM

## 2019-04-30 LAB
GLUCOSE URINE, POC: NEGATIVE
PROTEIN UA: NEGATIVE

## 2019-04-30 PROCEDURE — 81002 URINALYSIS NONAUTO W/O SCOPE: CPT | Performed by: OBSTETRICS & GYNECOLOGY

## 2019-04-30 PROCEDURE — 76818 FETAL BIOPHYS PROFILE W/NST: CPT | Performed by: OBSTETRICS & GYNECOLOGY

## 2019-04-30 PROCEDURE — 76820 UMBILICAL ARTERY ECHO: CPT | Performed by: OBSTETRICS & GYNECOLOGY

## 2019-04-30 PROCEDURE — 99213 OFFICE O/P EST LOW 20 MIN: CPT | Performed by: OBSTETRICS & GYNECOLOGY

## 2019-04-30 PROCEDURE — 76815 OB US LIMITED FETUS(S): CPT | Performed by: OBSTETRICS & GYNECOLOGY

## 2019-04-30 PROCEDURE — 99211 OFF/OP EST MAY X REQ PHY/QHP: CPT | Performed by: OBSTETRICS & GYNECOLOGY

## 2019-04-30 NOTE — LETTER
NON STRESS TEST INTERPRETATION    19    RE:  Clayton Jung   : 1985   AGE: 26 y.o. GESTATIONAL AGE:  34w3d    DIAGNOSIS:     Essential hypertension. Advanced maternal age. Post gastric bypass. Morbid obesity. INDICATION:  Essential hypertension.       TIME ON:  8:46 AM      TIME OFF:  9:06 AM      RESULT:   REACTIVE      FHR Baseline Rate:   140 bpm    PERIODIC CHANGES:    · Accelerations present, variability moderate, no decelerations noted    COMMENTS:      She is to continue having NST's every 3-4 days, and BPP with umbilical artery doppler studies once per week        Reji Jiménez MD

## 2019-04-30 NOTE — LETTER
19     RE:  Kim Webb   : 1985   AGE: 26 y.o. REFERRING PHYSICIAN:  Bard Wilmar MD    Dear   Mrs. Kim Webb a 26 y.o.  [id number]  is seen today on follow up in our office. REASON FOR APPOINTMENT:    · Follow up on a pregnant patient with morbid obesity, positive screen for gestational diabetes one hour GTT, status post gastric bypass surgery, advanced maternal age, essential hypertension,and first trimester exposure to an ACE inhibitor. MEDICATIONS:    Prenatal Vitamins once per day   Ferrous sulfate 325 mg by mouth per day. Labetalol 100 mg by mouth 3 times a day. Carafate 500 mg by mouth every three hours. Prilosec 20 mg by mouth per day. Flonase for treatment of sinus problems    INTERVAL HISTORY:  Mrs Kim Webb had an uneventful course of pregnancy since her last visit to our office. When seen today in our office she had no complaints. PHYSICAL EXAMINATION:  General Appearance:  Healthy looking, alert, no acute distress. Eyes:     No pallor, no icterus, no photophobia. Ears:     No ear drainage. Nose:     No nasal drainage, no paranasal sinus tenderness. Throat:   Mucosa moist, no oral thrush, no exudate. Neck:     No nuchal rigidity. Back:     No CVA tenderness. Abdomen:    Soft nontender. Extremities:    No pretibial pitting edema, no calf muscle tenderness. Skin:     No rashes, no lesions. BP: 132/80 Weight: (!) 339 lb (153.8 kg) Height: 5' 1\" (154.9 cm) Pulse: 79     Body mass index is 64.05 kg/m². Urine dipstick:  Glucose : Negative   Albumin:  Negative       An ultrasound evaluation was done in our office today. Please refer to the enclosed copy of the ultrasound report for further information. Review of her log book shows: Adequate sugar control with fasting sugars under 92 and 2hr pp under 120 mg/dl. IMPRESSION:  1. A  34w3d  intrauterine gestation. 2. Advanced maternal age. 3. Previous Polyhydramnios, resolved. 4. Excessive fetal growth  5. Elevated one hour 50 g Glucola test on 3/9/2019. 6. Could not tolerate the three-hour glucose tolerance test in your office (threw up)  7. Essential hypertension. 8. Morbid obesity. 9. Previous  delivery  10. First trimester exposure to ACE inhibitor lisinopril. 11. Status post gastric bypass surgery. RECOMMENDATIONS/PLAN:  I discussed with the patient the following points:    1. The size of her baby is now at the [address] [place] percentile appropriate for gestational age,( was above the 90th centile)  2. The previously described polyhydramnios resolved. 3. Her sugars are well controlled. 4. Her blood pressures are well controlled with the present dose of Labetalol. Essential hypertension is associated with an increased risk of developing intrauterine growth restriction and an increased risk of developing superimposed preeclampsia. The systolic should be kept under 260, diastolic under 437.  5. Fetal well-being was confirmed today. The amount of fluid around baby is normal.  The Biophysical profile score of 10/10 is reassuring, and the umbilical artery Doppler studies are normal.  6. She should monitor fetal well-being at home by counting movements after dinner. Her baby should  move 10 times in 2 hours; otherwise, she should call your office immediately. She is also to call, if she develops any headaches, blurred vision, abdominal pain, bleeding, or spotting, which are signs of preeclampsia. 7. She is to continue to follow with you in your office for ongoing obstetric care. 8. She should be monitored with nonstress test every Friday in the labor unit and with biophysical profiles and umbilical artery Doppler once a week in our office every Tuesday for remainder of pregnancy. Thank you again, doctor, for allowing us to be of service to your patient. If I can be of further assistance, please do not hesitate to call. Sincerely,        Gwendolyn Meraz M.D., [address] [facility]    Current encounter billing:  AR OFFICE OUTPATIENT VISIT 15 MINUTES [[id number]]  US OB 1 or More Fetus Limited [[id number] Custom]  Biophysical profile [OBO12 Custom]  US Doppler Fetal Umbilical Artery [[id number] Custom]      **This report has been created using voice recognition software.  It may contain minor errors     which are inherent in voice recognition technology**

## 2019-04-30 NOTE — PROGRESS NOTES
19     RE:  Garth Jiménez   : 1985   AGE: 26 y.o. REFERRING PHYSICIAN:  Mynor Rodriguez MD    Dear   Mrs. Garth Jiménez a 26 y.o.  [id number]  is seen today on follow up in our office. REASON FOR APPOINTMENT:    · Follow up on a pregnant patient with morbid obesity, positive screen for gestational diabetes one hour GTT, status post gastric bypass surgery, advanced maternal age, essential hypertension,and first trimester exposure to an ACE inhibitor. MEDICATIONS:    Prenatal Vitamins once per day   Ferrous sulfate 325 mg by mouth per day. Labetalol 100 mg by mouth 3 times a day. Carafate 500 mg by mouth every three hours. Prilosec 20 mg by mouth per day. Flonase for treatment of sinus problems    INTERVAL HISTORY:  Mrs Garth Jiménez had an uneventful course of pregnancy since her last visit to our office. When seen today in our office she had no complaints. PHYSICAL EXAMINATION:  General Appearance:  Healthy looking, alert, no acute distress. Eyes:     No pallor, no icterus, no photophobia. Ears:     No ear drainage. Nose:     No nasal drainage, no paranasal sinus tenderness. Throat:   Mucosa moist, no oral thrush, no exudate. Neck:     No nuchal rigidity. Back:     No CVA tenderness. Abdomen:    Soft nontender. Extremities:    No pretibial pitting edema, no calf muscle tenderness. Skin:     No rashes, no lesions. BP: 132/80 Weight: (!) 339 lb (153.8 kg) Height: 5' 1\" (154.9 cm) Pulse: 79     Body mass index is 64.05 kg/m². Urine dipstick:  Glucose : Negative   Albumin:  Negative       An ultrasound evaluation was done in our office today. Please refer to the enclosed copy of the ultrasound report for further information. Review of her log book shows: Adequate sugar control with fasting sugars under 92 and 2hr pp under 120 mg/dl. IMPRESSION:  1. A  34w3d  intrauterine gestation. 2. Advanced maternal age.   3. Previous Polyhydramnios, resolved. 4. Excessive fetal growth  5. Elevated one hour 50 g Glucola test on 3/9/2019. 6. Could not tolerate the three-hour glucose tolerance test in your office (threw up)  7. Essential hypertension. 8. Morbid obesity. 9. Previous  delivery  10. First trimester exposure to ACE inhibitor lisinopril. 11. Status post gastric bypass surgery. RECOMMENDATIONS/PLAN:  I discussed with the patient the following points:    1. The size of her baby is now at the [address] [place] percentile appropriate for gestational age,( was above the 90th centile)  2. The previously described polyhydramnios resolved. 3. Her sugars are well controlled. 4. Her blood pressures are well controlled with the present dose of Labetalol. Essential hypertension is associated with an increased risk of developing intrauterine growth restriction and an increased risk of developing superimposed preeclampsia. The systolic should be kept under 218, diastolic under 453.  5. Fetal well-being was confirmed today. The amount of fluid around baby is normal.  The Biophysical profile score of 10/10 is reassuring, and the umbilical artery Doppler studies are normal.  6. She should monitor fetal well-being at home by counting movements after dinner. Her baby should  move 10 times in 2 hours; otherwise, she should call your office immediately. She is also to call, if she develops any headaches, blurred vision, abdominal pain, bleeding, or spotting, which are signs of preeclampsia. 7. She is to continue to follow with you in your office for ongoing obstetric care. 8. She should be monitored with nonstress test every Friday in the labor unit and with biophysical profiles and umbilical artery Doppler once a week in our office every Tuesday for remainder of pregnancy. Thank you again, doctor, for allowing us to be of service to your patient. If I can be of further assistance, please do not hesitate to call.       Sincerely,        April Jonas, M.D., [facility]    Current encounter billing:  ME OFFICE OUTPATIENT VISIT 15 MINUTES [[id number]]  US OB 1 or More Fetus Limited [[id number] Custom]  Biophysical profile [OBO12 Custom]  US Doppler Fetal Umbilical Artery [[id number] Custom]      **This report has been created using voice recognition software. It may contain minor errors     which are inherent in voice recognition technology**                  NON STRESS TEST INTERPRETATION    19    RE:  Kim Webb   : 1985   AGE: 26 y.o. GESTATIONAL AGE:  34w3d    DIAGNOSIS:     Essential hypertension. Advanced maternal age. Post gastric bypass. Morbid obesity. INDICATION:  Essential hypertension.       TIME ON:  8:46 AM      TIME OFF:  9:06 AM      RESULT:   REACTIVE      FHR Baseline Rate:   140 bpm    PERIODIC CHANGES:    · Accelerations present, variability moderate, no decelerations noted    COMMENTS:      She is to continue having NST's every 3-4 days, and BPP with umbilical artery doppler studies once per week        Shayne Govea MD

## 2019-04-30 NOTE — PROGRESS NOTES
No c/o. Good fetal movement. Kick counts encouraged. Denies bleeding,lof,ctxc,headaches, visusl issies, epigastric discomfort.  Blood sugars wnl.+scanned to chartAll questions answered+ information confirmed by pt

## 2019-04-30 NOTE — PATIENT INSTRUCTIONS
You might be having an NST at your next appt. Please eat a large snack or breakfast before coming to office. Thank youCall your primary obstetrician with bleeding, leaking of fluid, abdominal tenderness, headache, blurry vision, epigastric pain and increased urinary frequency. Any questions contact [facility] at [phone]. If you are experiencing an emergency and need immediate help, call 911 or go to go emergency room or labor and delivery. Do kick counts after dinner. Call your primary obstetrician if less than 10 kicks in 2 hours after dinner. Call your primary obstetrician with bleeding, leaking of fluid, abdominal tenderness, headache, blurry vision, epigastric pain and increased urinary frequency. if you are sick, not feeling well or have an infectious process going on please reschedule your appointment by calling [phone]. Also if any family members are not feeling well, please do not bring them to your appointment. We appreciate your cooperation. We are doing this in order to protect our pregnant mothers+ their babies. if you are sick, not feeling well or have an infectious process going on please reschedule your appointment by calling [phone]. Also if any family members are not feeling well, please do not bring them to your appointment. We appreciate your cooperation. We are doing this in order to protect our pregnant mothers+ their babies. Patient Education        Weeks 34 to 39 of Your Pregnancy: Care Instructions  Your Care Instructions    By now, your baby and your belly have grown quite large. It is almost time to give birth. A full-term pregnancy can deliver between 37 and 42 weeks. Your baby's lungs are almost ready to breathe air. The bones in your baby's head are now firm enough to protect it, but soft enough to move down through the birth canal.  You may feel excited, happy, anxious, or scared. You may wonder how you will know if you are in labor or what to expect during labor.  Try to be flexible in your expectations of the birth. Because each birth is different, there is no way to know exactly what childbirth will be like for you. This care sheet will help you know what to expect and how to prepare. This may make your childbirth easier. If you haven't already had the Tdap shot during this pregnancy, talk to your doctor about getting it. It will help protect your  against pertussis infection. In the 36th week, most women have a test for group B streptococcus (GBS). GBS is a common bacteria that can live in the vagina and rectum. It can make your baby sick after birth. If you test positive, you will get antibiotics during labor. The medicine will keep your baby from getting the bacteria. Follow-up care is a key part of your treatment and safety. Be sure to make and go to all appointments, and call your doctor if you are having problems. It's also a good idea to know your test results and keep a list of the medicines you take. How can you care for yourself at home? Learn about pain relief choices  · Pain is different for every woman. Talk with your doctor about your feelings about pain. · You can choose from several types of pain relief. These include medicine or breathing techniques, as well as comfort measures. You can use more than one option. · If you choose to have pain medicine during labor, talk to your doctor about your options. Some medicines lower anxiety and help with some of the pain. Others make your lower body numb so that you won't feel pain. · Be sure to tell your doctor about your pain medicine choice before you start labor or very early in your labor. You may be able to change your mind as labor progresses. · Rarely, a woman is put to sleep by medicine given through a mask or an IV. Labor and delivery  · The first stage of labor has three parts: early, active, and transition. ? Most women have early labor at home.  You can stay busy or rest, eat light snacks, drink clear fluids, and start counting contractions. ? When talking during a contraction gets hard, you may be moving to active labor. During active labor, you should head for the hospital if you are not there already. ? You are in active labor when contractions come every 3 to 4 minutes and last about 60 seconds. Your cervix is opening more rapidly. ? If your water breaks, contractions will come faster and stronger. ? During transition, your cervix is stretching, and contractions are coming more rapidly. ? You may want to push, but your cervix might not be ready. Your doctor will tell you when to push. · The second stage starts when your cervix is completely opened and you are ready to push. ? Contractions are very strong to push the baby down the birth canal.  ? You will feel the urge to push. You may feel like you need to have a bowel movement. ? You may be coached to push with contractions. These contractions will be very strong, but you will not have them as often. You can get a little rest between contractions. ? You may be emotional and irritable. You may not be aware of what is going on around you.  ? One last push, and your baby is born. · The third stage is when a few more contractions push out the placenta. This may take 30 minutes or less. · The fourth stage is the welcome recovery. You may feel overwhelmed with emotions and exhausted but alert. This is a good time to start breastfeeding. Where can you learn more? Go to https://M2Gdel.healthHALGI. org and sign in to your [employer] account. Enter Y916 in the [facility] box to learn more about \"Weeks 34 to 36 of Your Pregnancy: Care Instructions. \"     If you do not have an account, please click on the \"Sign Up Now\" link. Current as of: September 5, 2018  Content Version: 11.9  © 9668-8480 [employer]. Care instructions adapted under license by [facility] ([facility]).  If you have questions about a medical condition or this instruction, always ask your healthcare professional. [place] [address] any warranty or liability for your use of this information. Patient Education        Learning About When to Call Your Doctor During Pregnancy (After 20 Weeks)  Your Care Instructions  It's common to have concerns about what might be a problem during pregnancy. Although most pregnant women don't have any serious problems, it's important to know when to call your doctor if you have certain symptoms or signs of labor. These are general suggestions. Your doctor may give you some more information about when to call. When to call your doctor (after 20 weeks)  Call 911 anytime you think you may need emergency care. For example, call if:  · You have severe vaginal bleeding. · You have sudden, severe pain in your belly. · You passed out (lost consciousness). · You have a seizure. · You see or feel the umbilical cord. · You think you are about to deliver your baby and can't make it safely to the hospital.  Call your doctor now or seek immediate medical care if:  · You have vaginal bleeding. · You have belly pain. · You have a fever. · You have symptoms of preeclampsia, such as:  ? Sudden swelling of your face, hands, or feet. ? New vision problems (such as dimness or blurring). ? A severe headache. · You have a sudden release of fluid from your vagina. (You think your water broke.)  · You think that you may be in labor. This means that you've had at least 4 contractions within 20 minutes or at least 8 contractions in an hour. · You notice that your baby has stopped moving or is moving much less than normal.  · You have symptoms of a urinary tract infection. These may include:  ? Pain or burning when you urinate. ? A frequent need to urinate without being able to pass much urine. ? Pain in the flank, which is just below the rib cage and above the waist on either side of the back. ? Blood in your urine.   Watch closely for changes in your health, and be sure to contact your doctor if:  · You have vaginal discharge that smells bad. · You have skin changes, such as:  ? A rash. ? Itching. ? Yellow color to your skin. · You have other concerns about your pregnancy. If you have labor signs at 37 weeks or more  If you have signs of labor at 37 weeks or more, your doctor may tell you to call when your labor becomes more active. Symptoms of active labor include:  · Contractions that are regular. · Contractions that are less than 5 minutes apart. · Contractions that are hard to talk through. Follow-up care is a key part of your treatment and safety. Be sure to make and go to all appointments, and call your doctor if you are having problems. It's also a good idea to know your test results and keep a list of the medicines you take. Where can you learn more? Go to https://chpesharyn.[employer] Services. org and sign in to your [employer]Services account. Enter  in the [employer] box to learn more about \"Learning About When to Call Your Doctor During Pregnancy (After 20 Weeks). \"     If you do not have an account, please click on the \"Sign Up Now\" link. Current as of: September 5, 2018  Content Version: 11.9  © 5508-9905 [employer]orporated. Care instructions adapted under license by [facility] ([facility]). If you have questions about a medical condition or this instruction, always ask your healthcare professional. [place] [address] any warranty or liability for your use of this information. Patient Education        Counting Your Baby's Kicks: Care Instructions  Your Care Instructions    Counting your baby's kicks is one way your doctor can tell that your baby is healthy. Most women--especially in a first pregnancy--feel their baby move for the first time between 16 and 22 weeks. The movement may feel like flutters rather than kicks. Your baby may move more at certain times of the day.  When you are active, you may notice less kicking than when you are resting. At your prenatal visits, your doctor will ask whether the baby is active. In your last trimester, your doctor may ask you to count the number of times you feel your baby move. Follow-up care is a key part of your treatment and safety. Be sure to make and go to all appointments, and call your doctor if you are having problems. It's also a good idea to know your test results and keep a list of the medicines you take. How do you count fetal kicks? · A common method of checking your baby's movement is to count the number of kicks or moves you feel in 1 hour. Ten movements (such as kicks, flutters, or rolls) in 1 hour are normal. Some doctors suggest that you count in the morning until you get to 10 movements. Then you can quit for that day and start again the next day. · Pick your baby's most active time of day to count. This may be any time from morning to evening. · If you do not feel 10 movements in an hour, your baby may be sleeping. Wait for the next hour and count again. When should you call for help? Call your doctor now or seek immediate medical care if:    · You noticed that your baby has stopped moving or is moving much less than normal.    Watch closely for changes in your health, and be sure to contact your doctor if you have any problems. Where can you learn more? Go to https://BesstechpemarsewMclowd."MoAnima, Inc.". org and sign in to your [employer] account. Enter C074 in the [facility] box to learn more about \"Counting Your Baby's Kicks: Care Instructions. \"     If you do not have an account, please click on the \"Sign Up Now\" link. Current as of: September 5, 2018  Content Version: 11.9  © 0437-0347 [employer]. Care instructions adapted under license by [facility] ([facility]).  If you have questions about a medical condition or this instruction, always ask your healthcare professional. [address] any warranty or liability for your use of this information. Patient Education        High Blood Pressure in Pregnancy: Care Instructions  Your Care Instructions    High blood pressure (hypertension) means that the force of blood against your artery walls is too strong. Mild high blood pressure during pregnancy is not usually dangerous. Your doctor will probably just want to watch you closely. But when blood pressure is very high, it can reduce oxygen to your baby. This can affect how well your baby grows. High blood pressure also means that you are at higher risk for:  · Preeclampsia. This is a problem that includes high blood pressure and damage to your liver or kidneys. It can also reduce how much oxygen your baby gets. In some cases, it leads to eclampsia. Eclampsia causes seizures. · Placental abruption. This is a problem when the placenta separates from the uterus before birth. It prevents the baby from getting enough oxygen and nutrients. Sometimes it can cause death for the baby and the mother. To prevent problems for you or your baby, you will have to check your blood pressure often. You will do this until after your baby is born. If your blood pressure rises suddenly or is very high during your pregnancy, your doctor may prescribe medicines. They can usually control blood pressure. If your blood pressure affects your or your baby's health, your doctor may need to deliver your baby early. After your baby is born, your blood pressure will probably improve. But sometimes blood pressure problems continue after birth. Follow-up care is a key part of your treatment and safety. Be sure to make and go to all appointments, and call your doctor if you are having problems. It's also a good idea to know your test results and keep a list of the medicines you take. How can you care for yourself at home? · Take and write down your blood pressure at home if your doctor tells you to.   · Take your medicines exactly as prescribed. Call your doctor if you think you are having a problem with your medicine. · Do not smoke. If you need help quitting, talk to your doctor about stop-smoking programs and medicines. These can increase your chances of quitting for good. · Do not gain too much weight during your pregnancy. Talk to your doctor about how much weight gain is healthy. · Eat a healthy diet. · If your doctor says it's okay, get regular exercise. Walking or swimming several times a week can be healthy for you and your baby. · Reduce stress, and find time to relax. This is very important if you continue to work or have a busy schedule. It's also important if you have small children at home. When should you call for help? Call 911 anytime you think you may need emergency care. For example, call if:    · You passed out (lost consciousness).     · You have a seizure.    Call your doctor now or seek immediate medical care if:    · You have symptoms of preeclampsia, such as:  ? Sudden swelling of your face, hands, or feet. ? New vision problems (such as dimness or blurring). ? A severe headache.     · Your blood pressure is higher than it should be or rises suddenly.     · You have new nausea or vomiting.     · You think that you are in labor.     · You have pain in your belly or pelvis.    Watch closely for changes in your health, and be sure to contact your doctor if:    · You gain weight rapidly. Where can you learn more? Go to https://restorgenex corpedl.Lending Club. org and sign in to your [employer] In Chef account. Enter M961 in the [employer] box to learn more about \"High Blood Pressure in Pregnancy: Care Instructions. \"     If you do not have an account, please click on the \"Sign Up Now\" link. Current as of: September 5, 2018  Content Version: 11.9  © 0093-4371 [employer], Incorporated. Care instructions adapted under license by [facility] ([facility]).  If you have questions about a medical condition or this instruction, always ask your healthcare professional. [place] [address] any warranty or liability for your use of this information.

## 2019-05-03 ENCOUNTER — ROUTINE PRENATAL (OUTPATIENT)
Dept: OBGYN CLINIC | Age: 34
End: 2019-05-03
Payer: COMMERCIAL

## 2019-05-03 VITALS
HEART RATE: 83 BPM | BODY MASS INDEX: 55.32 KG/M2 | SYSTOLIC BLOOD PRESSURE: 126 MMHG | DIASTOLIC BLOOD PRESSURE: 81 MMHG | HEIGHT: 61 IN | WEIGHT: 293 LBS

## 2019-05-03 DIAGNOSIS — O99.843 PREVIOUS GASTRIC BYPASS AFFECTING PREGNANCY IN THIRD TRIMESTER, ANTEPARTUM: ICD-10-CM

## 2019-05-03 DIAGNOSIS — Z3A.34 34 WEEKS GESTATION OF PREGNANCY: ICD-10-CM

## 2019-05-03 DIAGNOSIS — E66.01 MATERNAL MORBID OBESITY IN THIRD TRIMESTER, ANTEPARTUM (HCC): ICD-10-CM

## 2019-05-03 DIAGNOSIS — O99.213 MATERNAL MORBID OBESITY IN THIRD TRIMESTER, ANTEPARTUM (HCC): ICD-10-CM

## 2019-05-03 DIAGNOSIS — O10.013 ESSENTIAL HYPERTENSION AFFECTING PREGNANCY IN THIRD TRIMESTER: Primary | ICD-10-CM

## 2019-05-03 DIAGNOSIS — O09.523 ELDERLY MULTIGRAVIDA, THIRD TRIMESTER: ICD-10-CM

## 2019-05-03 LAB
GLUCOSE URINE, POC: NORMAL
PROTEIN UA: NEGATIVE

## 2019-05-03 PROCEDURE — 81002 URINALYSIS NONAUTO W/O SCOPE: CPT | Performed by: OBSTETRICS & GYNECOLOGY

## 2019-05-03 PROCEDURE — 99999 PR OFFICE/OUTPT VISIT,PROCEDURE ONLY: CPT | Performed by: OBSTETRICS & GYNECOLOGY

## 2019-05-03 PROCEDURE — 59025 FETAL NON-STRESS TEST: CPT | Performed by: OBSTETRICS & GYNECOLOGY

## 2019-05-03 PROCEDURE — 99211 OFF/OP EST MAY X REQ PHY/QHP: CPT | Performed by: OBSTETRICS & GYNECOLOGY

## 2019-05-03 NOTE — LETTER
NON STRESS TEST INTERPRETATION    5/3/19    RE:  Kathrine Mckeon   : 1985   AGE: 26 y.o. GESTATIONAL AGE:  34w6d    DIAGNOSIS:     Essential hypertension. Advanced maternal age. Post gastric bypass. Morbid obesity. INDICATION:  Essential hypertension.       TIME ON:  8:28 AM      TIME OFF:  8:52 AM      RESULT:   REACTIVE      FHR Baseline Rate:   140 bpm    PERIODIC CHANGES:    · Accelerations present, variability moderate, no decelerations noted    COMMENTS:      She is to continue having NST's every 3-4 days, and BPP with umbilical artery doppler studies once per week        Feliberto Felix MD      Current encounter billing:  MD OFFICE/OUTPT VISIT,PROCEDURE ONLY [[id number]  [id number] MD FETAL NON-STRESS TEST

## 2019-05-03 NOTE — PROGRESS NOTES
NON STRESS TEST INTERPRETATION    5/3/19    RE:  Jane Jennings   : 1985   AGE: 26 y.o. GESTATIONAL AGE:  34w6d    DIAGNOSIS:     Essential hypertension. Advanced maternal age. Post gastric bypass. Morbid obesity. INDICATION:  Essential hypertension.       TIME ON:  8:28 AM      TIME OFF:  8:52 AM      RESULT:   REACTIVE      FHR Baseline Rate:   140 bpm    PERIODIC CHANGES:    · Accelerations present, variability moderate, no decelerations noted    COMMENTS:      She is to continue having NST's every 3-4 days, and BPP with umbilical artery doppler studies once per week        Angelika Anderson MD      Current encounter billing:  NH OFFICE/OUTPT VISIT,PROCEDURE ONLY [[id number] NH FETAL NON-STRESS TEST

## 2019-05-03 NOTE — PROGRESS NOTES
No c/o. Good fetal movement. Kick counts encouraged. Denies bleeding,lof,ctx,headaches,visual issues,epigastric issues. All questions answered+ information confirmed by pt

## 2019-05-03 NOTE — PATIENT INSTRUCTIONS
You might be having an NST at your next appt. Please eat a large snack or breakfast before coming to office. Thank youCall your primary obstetrician with bleeding, leaking of fluid, abdominal tenderness, headache, blurry vision, epigastric pain and increased urinary frequency. Any questions contact [facility] at [phone]. If you are experiencing an emergency and need immediate help, call 911 or go to go emergency room or labor and delivery. if you are sick, not feeling well or have an infectious process going on please reschedule your appointment by calling [phone]. Also if any family members are not feeling well, please do not bring them to your appointment. We appreciate your cooperation. We are doing this in order to protect our pregnant mothers+ their babies. Patient Education        Weeks 34 to 39 of Your Pregnancy: Care Instructions  Your Care Instructions    By now, your baby and your belly have grown quite large. It is almost time to give birth. A full-term pregnancy can deliver between 37 and 42 weeks. Your baby's lungs are almost ready to breathe air. The bones in your baby's head are now firm enough to protect it, but soft enough to move down through the birth canal.  You may feel excited, happy, anxious, or scared. You may wonder how you will know if you are in labor or what to expect during labor. Try to be flexible in your expectations of the birth. Because each birth is different, there is no way to know exactly what childbirth will be like for you. This care sheet will help you know what to expect and how to prepare. This may make your childbirth easier. If you haven't already had the Tdap shot during this pregnancy, talk to your doctor about getting it. It will help protect your  against pertussis infection. In the 36th week, most women have a test for group B streptococcus (GBS). GBS is a common bacteria that can live in the vagina and rectum. It can make your baby sick after birth.  If you test positive, you will get antibiotics during labor. The medicine will keep your baby from getting the bacteria. Follow-up care is a key part of your treatment and safety. Be sure to make and go to all appointments, and call your doctor if you are having problems. It's also a good idea to know your test results and keep a list of the medicines you take. How can you care for yourself at home? Learn about pain relief choices  · Pain is different for every woman. Talk with your doctor about your feelings about pain. · You can choose from several types of pain relief. These include medicine or breathing techniques, as well as comfort measures. You can use more than one option. · If you choose to have pain medicine during labor, talk to your doctor about your options. Some medicines lower anxiety and help with some of the pain. Others make your lower body numb so that you won't feel pain. · Be sure to tell your doctor about your pain medicine choice before you start labor or very early in your labor. You may be able to change your mind as labor progresses. · Rarely, a woman is put to sleep by medicine given through a mask or an IV. Labor and delivery  · The first stage of labor has three parts: early, active, and transition. ? Most women have early labor at home. You can stay busy or rest, eat light snacks, drink clear fluids, and start counting contractions. ? When talking during a contraction gets hard, you may be moving to active labor. During active labor, you should head for the hospital if you are not there already. ? You are in active labor when contractions come every 3 to 4 minutes and last about 60 seconds. Your cervix is opening more rapidly. ? If your water breaks, contractions will come faster and stronger. ? During transition, your cervix is stretching, and contractions are coming more rapidly. ? You may want to push, but your cervix might not be ready.  Your doctor will tell you when to push.  · The second stage starts when your cervix is completely opened and you are ready to push. ? Contractions are very strong to push the baby down the birth canal.  ? You will feel the urge to push. You may feel like you need to have a bowel movement. ? You may be coached to push with contractions. These contractions will be very strong, but you will not have them as often. You can get a little rest between contractions. ? You may be emotional and irritable. You may not be aware of what is going on around you.  ? One last push, and your baby is born. · The third stage is when a few more contractions push out the placenta. This may take 30 minutes or less. · The fourth stage is the welcome recovery. You may feel overwhelmed with emotions and exhausted but alert. This is a good time to start breastfeeding. Where can you learn more? Go to https://Wikidatapesharyn.TensorComm. org and sign in to your kwiry account. Enter V752 in the Boxee box to learn more about \"Weeks 34 to 36 of Your Pregnancy: Care Instructions. \"     If you do not have an account, please click on the \"Sign Up Now\" link. Current as of: September 5, 2018  Content Version: 11.9  © 4175-2278 Healthwise, Incorporated. Care instructions adapted under license by [facility] ([facility]). If you have questions about a medical condition or this instruction, always ask your healthcare professional. [place] [address] any warranty or liability for your use of this information. Patient Education        Learning About When to Call Your Doctor During Pregnancy (After 20 Weeks)  Your Care Instructions  It's common to have concerns about what might be a problem during pregnancy. Although most pregnant women don't have any serious problems, it's important to know when to call your doctor if you have certain symptoms or signs of labor. These are general suggestions.  Your doctor may give you some more information about when to call. When to call your doctor (after 20 weeks)  Call 911 anytime you think you may need emergency care. For example, call if:  · You have severe vaginal bleeding. · You have sudden, severe pain in your belly. · You passed out (lost consciousness). · You have a seizure. · You see or feel the umbilical cord. · You think you are about to deliver your baby and can't make it safely to the hospital.  Call your doctor now or seek immediate medical care if:  · You have vaginal bleeding. · You have belly pain. · You have a fever. · You have symptoms of preeclampsia, such as:  ? Sudden swelling of your face, hands, or feet. ? New vision problems (such as dimness or blurring). ? A severe headache. · You have a sudden release of fluid from your vagina. (You think your water broke.)  · You think that you may be in labor. This means that you've had at least 4 contractions within 20 minutes or at least 8 contractions in an hour. · You notice that your baby has stopped moving or is moving much less than normal.  · You have symptoms of a urinary tract infection. These may include:  ? Pain or burning when you urinate. ? A frequent need to urinate without being able to pass much urine. ? Pain in the flank, which is just below the rib cage and above the waist on either side of the back. ? Blood in your urine. Watch closely for changes in your health, and be sure to contact your doctor if:  · You have vaginal discharge that smells bad. · You have skin changes, such as:  ? A rash. ? Itching. ? Yellow color to your skin. · You have other concerns about your pregnancy. If you have labor signs at 37 weeks or more  If you have signs of labor at 37 weeks or more, your doctor may tell you to call when your labor becomes more active. Symptoms of active labor include:  · Contractions that are regular. · Contractions that are less than 5 minutes apart. · Contractions that are hard to talk through.   Follow-up care is a key part of your treatment and safety. Be sure to make and go to all appointments, and call your doctor if you are having problems. It's also a good idea to know your test results and keep a list of the medicines you take. Where can you learn more? Go to https://chpemarseweb.Zouxiu. org and sign in to your MobileWeaver account. Enter  in the Carena box to learn more about \"Learning About When to Call Your Doctor During Pregnancy (After 20 Weeks). \"     If you do not have an account, please click on the \"Sign Up Now\" link. Current as of: September 5, 2018  Content Version: 11.9  © 0239-8727 [employer]. Care instructions adapted under license by [facility] [facility] ([facility]). If you have questions about a medical condition or this instruction, always ask your healthcare professional. [place] [address] any warranty or liability for your use of this information. Patient Education        High Blood Pressure in Pregnancy: Care Instructions  Your Care Instructions    High blood pressure (hypertension) means that the force of blood against your artery walls is too strong. Mild high blood pressure during pregnancy is not usually dangerous. Your doctor will probably just want to watch you closely. But when blood pressure is very high, it can reduce oxygen to your baby. This can affect how well your baby grows. High blood pressure also means that you are at higher risk for:  · Preeclampsia. This is a problem that includes high blood pressure and damage to your liver or kidneys. It can also reduce how much oxygen your baby gets. In some cases, it leads to eclampsia. Eclampsia causes seizures. · Placental abruption. This is a problem when the placenta separates from the uterus before birth. It prevents the baby from getting enough oxygen and nutrients. Sometimes it can cause death for the baby and the mother.   To prevent problems for you or your baby, you will have to check your blood pressure often. You will do this until after your baby is born. If your blood pressure rises suddenly or is very high during your pregnancy, your doctor may prescribe medicines. They can usually control blood pressure. If your blood pressure affects your or your baby's health, your doctor may need to deliver your baby early. After your baby is born, your blood pressure will probably improve. But sometimes blood pressure problems continue after birth. Follow-up care is a key part of your treatment and safety. Be sure to make and go to all appointments, and call your doctor if you are having problems. It's also a good idea to know your test results and keep a list of the medicines you take. How can you care for yourself at home? · Take and write down your blood pressure at home if your doctor tells you to. · Take your medicines exactly as prescribed. Call your doctor if you think you are having a problem with your medicine. · Do not smoke. If you need help quitting, talk to your doctor about stop-smoking programs and medicines. These can increase your chances of quitting for good. · Do not gain too much weight during your pregnancy. Talk to your doctor about how much weight gain is healthy. · Eat a healthy diet. · If your doctor says it's okay, get regular exercise. Walking or swimming several times a week can be healthy for you and your baby. · Reduce stress, and find time to relax. This is very important if you continue to work or have a busy schedule. It's also important if you have small children at home. When should you call for help? Call 911 anytime you think you may need emergency care. For example, call if:    · You passed out (lost consciousness).     · You have a seizure.    Call your doctor now or seek immediate medical care if:    · You have symptoms of preeclampsia, such as:  ? Sudden swelling of your face, hands, or feet. ? New vision problems (such as dimness or blurring). ?  A severe headache.     · Your blood pressure is higher than it should be or rises suddenly.     · You have new nausea or vomiting.     · You think that you are in labor.     · You have pain in your belly or pelvis.    Watch closely for changes in your health, and be sure to contact your doctor if:    · You gain weight rapidly. Where can you learn more? Go to https://Munch On Mepepicewbetzaida.Thrasos. org and sign in to your Bharat Matrimony account. Enter K520 in the Openbay box to learn more about \"High Blood Pressure in Pregnancy: Care Instructions. \"     If you do not have an account, please click on the \"Sign Up Now\" link. Current as of: September 5, 2018  Content Version: 11.9  © 1330-7652 [employer], BetterLesson. Care instructions adapted under license by [facility] ([facility]). If you have questions about a medical condition or this instruction, always ask your healthcare professional. [place] [address] any warranty or liability for your use of this information. Patient Education        Counting Your Baby's Kicks: Care Instructions  Your Care Instructions    Counting your baby's kicks is one way your doctor can tell that your baby is healthy. Most women--especially in a first pregnancy--feel their baby move for the first time between 16 and 22 weeks. The movement may feel like flutters rather than kicks. Your baby may move more at certain times of the day. When you are active, you may notice less kicking than when you are resting. At your prenatal visits, your doctor will ask whether the baby is active. In your last trimester, your doctor may ask you to count the number of times you feel your baby move. Follow-up care is a key part of your treatment and safety. Be sure to make and go to all appointments, and call your doctor if you are having problems. It's also a good idea to know your test results and keep a list of the medicines you take. How do you count fetal kicks?   · A common method of checking your baby's movement is to count the number of kicks or moves you feel in 1 hour. Ten movements (such as kicks, flutters, or rolls) in 1 hour are normal. Some doctors suggest that you count in the morning until you get to 10 movements. Then you can quit for that day and start again the next day. · Pick your baby's most active time of day to count. This may be any time from morning to evening. · If you do not feel 10 movements in an hour, your baby may be sleeping. Wait for the next hour and count again. When should you call for help? Call your doctor now or seek immediate medical care if:    · You noticed that your baby has stopped moving or is moving much less than normal.    Watch closely for changes in your health, and be sure to contact your doctor if you have any problems. Where can you learn more? Go to https://Quorum SystemspeGood Technology.UGOBE. org and sign in to your Elimi account. Enter R114 in the QR Pharma box to learn more about \"Counting Your Baby's Kicks: Care Instructions. \"     If you do not have an account, please click on the \"Sign Up Now\" link. Current as of: September 5, 2018  Content Version: 11.9  © 4078-8077 [employer] acquired by [employer]. Care instructions adapted under license by [address] If you have questions about a medical condition or this instruction, always ask your healthcare professional. [place] [address] any warranty or liability for your use of this information.

## 2019-05-07 ENCOUNTER — ROUTINE PRENATAL (OUTPATIENT)
Dept: OBGYN CLINIC | Age: 34
End: 2019-05-07
Payer: COMMERCIAL

## 2019-05-07 VITALS
BODY MASS INDEX: 55.32 KG/M2 | SYSTOLIC BLOOD PRESSURE: 115 MMHG | WEIGHT: 293 LBS | HEIGHT: 61 IN | HEART RATE: 84 BPM | DIASTOLIC BLOOD PRESSURE: 78 MMHG

## 2019-05-07 DIAGNOSIS — E66.01 MATERNAL MORBID OBESITY IN THIRD TRIMESTER, ANTEPARTUM (HCC): ICD-10-CM

## 2019-05-07 DIAGNOSIS — O35.5XX0 SUSPECTED DAMAGE TO FETUS FROM DRUGS, AFFECTING MANAGEMENT OF MOTHER, SINGLE OR UNSPECIFIED FETUS: ICD-10-CM

## 2019-05-07 DIAGNOSIS — O99.213 MATERNAL MORBID OBESITY IN THIRD TRIMESTER, ANTEPARTUM (HCC): ICD-10-CM

## 2019-05-07 DIAGNOSIS — O10.013 ESSENTIAL HYPERTENSION AFFECTING PREGNANCY IN THIRD TRIMESTER: Primary | ICD-10-CM

## 2019-05-07 DIAGNOSIS — O09.523 ELDERLY MULTIGRAVIDA, THIRD TRIMESTER: ICD-10-CM

## 2019-05-07 DIAGNOSIS — O99.843 PREVIOUS GASTRIC BYPASS AFFECTING PREGNANCY IN THIRD TRIMESTER, ANTEPARTUM: ICD-10-CM

## 2019-05-07 DIAGNOSIS — Z3A.35 35 WEEKS GESTATION OF PREGNANCY: ICD-10-CM

## 2019-05-07 LAB
GLUCOSE URINE, POC: NEGATIVE
PROTEIN UA: NEGATIVE

## 2019-05-07 PROCEDURE — 99213 OFFICE O/P EST LOW 20 MIN: CPT | Performed by: OBSTETRICS & GYNECOLOGY

## 2019-05-07 PROCEDURE — 81002 URINALYSIS NONAUTO W/O SCOPE: CPT | Performed by: OBSTETRICS & GYNECOLOGY

## 2019-05-07 PROCEDURE — 76820 UMBILICAL ARTERY ECHO: CPT | Performed by: OBSTETRICS & GYNECOLOGY

## 2019-05-07 PROCEDURE — 76816 OB US FOLLOW-UP PER FETUS: CPT | Performed by: OBSTETRICS & GYNECOLOGY

## 2019-05-07 PROCEDURE — 76818 FETAL BIOPHYS PROFILE W/NST: CPT | Performed by: OBSTETRICS & GYNECOLOGY

## 2019-05-07 PROCEDURE — 99211 OFF/OP EST MAY X REQ PHY/QHP: CPT | Performed by: OBSTETRICS & GYNECOLOGY

## 2019-05-07 NOTE — PROGRESS NOTES
NST completyed. Baseline 150 with moderate variability+ accelelrations.  Reactive per dr Juma Johnston

## 2019-05-07 NOTE — LETTER
NON STRESS TEST INTERPRETATION    19    RE:  Kim Webb   : 1985   AGE: 26 y.o. GESTATIONAL AGE:  35w3d    DIAGNOSIS:     Essential hypertension. Advanced maternal age. Post gastric bypass. Morbid obesity. INDICATION:  Essential hypertension.       TIME ON:  10:42 AM      TIME OFF:  11:06 AM      RESULT:   REACTIVE      FHR Baseline Rate:   150 bpm    PERIODIC CHANGES:    · Accelerations present, variability moderate, no decelerations noted    COMMENTS:      She is to continue having NST's every 3-4 days, and BPP with umbilical artery doppler studies once per week        Shayne Govea MD

## 2019-05-07 NOTE — LETTER
19     RE:  Jarred Rodriguez   : 1985   AGE: 26 y.o. REFERRING PHYSICIAN:  Alfred Ferguson MD    Dear   Mrs. Jarred Rodriguez a 26 y.o.  [id number]  is seen today on follow up in our office. REASON FOR APPOINTMENT:  · Follow up on a pregnant patient with morbid obesity, positive screen for gestational diabetes one hour GTT, status post gastric bypass surgery, advanced maternal age, essential hypertension,and first trimester exposure to an ACE inhibitor. MEDICATIONS:    Prenatal Vitamins once per day   Ferrous sulfate 325 mg by mouth per day. Labetalol 100 mg by mouth 3 times a day. Carafate 500 mg by mouth every three hours. Prilosec 20 mg by mouth per day. Flonase for treatment of sinus problems    INTERVAL HISTORY:  Mrs Jarred Rodriguez had an uneventful course of pregnancy since her last visit to our office. When seen today in our office she had no complaints. PHYSICAL EXAMINATION:  General Appearance:  Healthy looking, alert, no acute distress. Eyes:     No pallor, no icterus, no photophobia. Ears:     No ear drainage. Nose:     No nasal drainage, no paranasal sinus tenderness. Throat:   Mucosa moist, no oral thrush, no exudate. Neck:     No nuchal rigidity. Back:     No CVA tenderness. Abdomen:    Soft nontender. Extremities:    No pretibial pitting edema, no calf muscle tenderness. Skin:     No rashes, no lesions. BP: (!) 142/75, repeated BP: 115/78 Weight: (!) 341 lb (154.7 kg) Height: 5' 1\" (154.9 cm) Pulse: 84     Body mass index is 64.43 kg/m². Urine dipstick:  Glucose : Negative   Albumin:  Negative       An ultrasound evaluation was done in our office today. Please refer to the enclosed copy of the ultrasound report for further information. IMPRESSION:  1. A  35wd  intrauterine gestation. 2. Advanced maternal age. 3. Previous Polyhydramnios, resolved.   4. Excessive fetal growth 5. Elevated one hour 50 g Glucola test on 3/9/2019. 6. Could not tolerate the three-hour glucose tolerance test in your office (threw up)  7. Essential hypertension. 8. Morbid obesity. 9. Previous  delivery  10. First trimester exposure to ACE inhibitor lisinopril. 11. Status post gastric bypass surgery. RECOMMENDATIONS/PLAN:  I discussed with the patient the following points:    1. The size of her baby is now at the [address] percentile appropriate for gestational age,( was above the 90th centile)  2. The previously described polyhydramnios resolved. 3. Her sugars are well controlled. 4. Her blood pressures are well controlled with the present dose of Labetalol. Essential hypertension is associated with an increased risk of developing intrauterine growth restriction and an increased risk of developing superimposed preeclampsia. The systolic should be kept under 375, diastolic under 934.  5. Fetal well-being was confirmed today. The amount of fluid around baby is normal.  The Biophysical profile score of 10/10 is reassuring, and the umbilical artery Doppler studies are normal.  6. She should monitor fetal well-being at home by counting movements after dinner. Her baby should  move 10 times in 2 hours; otherwise, she should call your office immediately. She is also to call, if she develops any headaches, blurred vision, abdominal pain, bleeding, or spotting, which are signs of preeclampsia. 7. She is to continue to follow with you in your office for ongoing obstetric care. 8. She should continue to be monitored with nonstress tests every 3-4 days and biophysical profiles with umbilical artery Dopplers once a week for remainder of pregnancy. Thank you again, doctor, for allowing us to be of service to your patient. If I can be of further assistance, please do not hesitate to call.       Sincerely,        Oda Baumgarten, M.D., [facility] [facility]    Current encounter billing: [facility] OFFICE OUTPATIENT VISIT 15 MINUTES [[id number]]  US OB Follow Up Transabdominal Approach [RMP126 Custom]  Biophysical profile [OBO12 Custom]  US Doppler Fetal Umbilical Artery [[id number] Custom]    **This report has been created using voice recognition software.  It may contain minor errors     which are inherent in voice recognition technology**

## 2019-05-07 NOTE — PATIENT INSTRUCTIONS
You might be having an NST at your next appt. Please eat a large snack or breakfast before coming to office. Thank youCall your primary obstetrician with bleeding, leaking of fluid, abdominal tenderness, headache, blurry vision, epigastric pain and increased urinary frequency. Any questions contact [facility] at [phone]. If you are experiencing an emergency and need immediate help, call 911 or go to go emergency room or labor and delivery. Do kick counts after dinner. Call your primary obstetrician if less than 10 kicks in 2 hours after dinner. Call your primary obstetrician with bleeding, leaking of fluid, abdominal tenderness, headache, blurry vision, epigastric pain and increased urinary frequency. if you are sick, not feeling well or have an infectious process going on please reschedule your appointment by calling [phone]. Also if any family members are not feeling well, please do not bring them to your appointment. We appreciate your cooperation. We are doing this in order to protect our pregnant mothers+ their babies. Patient Education        Weeks 34 to 39 of Your Pregnancy: Care Instructions  Your Care Instructions    By now, your baby and your belly have grown quite large. It is almost time to give birth. A full-term pregnancy can deliver between 37 and 42 weeks. Your baby's lungs are almost ready to breathe air. The bones in your baby's head are now firm enough to protect it, but soft enough to move down through the birth canal.  You may feel excited, happy, anxious, or scared. You may wonder how you will know if you are in labor or what to expect during labor. Try to be flexible in your expectations of the birth. Because each birth is different, there is no way to know exactly what childbirth will be like for you. This care sheet will help you know what to expect and how to prepare. This may make your childbirth easier.   If you haven't already had the Tdap shot during this pregnancy, talk to your doctor about getting it. It will help protect your  against pertussis infection. In the 36th week, most women have a test for group B streptococcus (GBS). GBS is a common bacteria that can live in the vagina and rectum. It can make your baby sick after birth. If you test positive, you will get antibiotics during labor. The medicine will keep your baby from getting the bacteria. Follow-up care is a key part of your treatment and safety. Be sure to make and go to all appointments, and call your doctor if you are having problems. It's also a good idea to know your test results and keep a list of the medicines you take. How can you care for yourself at home? Learn about pain relief choices  · Pain is different for every woman. Talk with your doctor about your feelings about pain. · You can choose from several types of pain relief. These include medicine or breathing techniques, as well as comfort measures. You can use more than one option. · If you choose to have pain medicine during labor, talk to your doctor about your options. Some medicines lower anxiety and help with some of the pain. Others make your lower body numb so that you won't feel pain. · Be sure to tell your doctor about your pain medicine choice before you start labor or very early in your labor. You may be able to change your mind as labor progresses. · Rarely, a woman is put to sleep by medicine given through a mask or an IV. Labor and delivery  · The first stage of labor has three parts: early, active, and transition. ? Most women have early labor at home. You can stay busy or rest, eat light snacks, drink clear fluids, and start counting contractions. ? When talking during a contraction gets hard, you may be moving to active labor. During active labor, you should head for the hospital if you are not there already. ? You are in active labor when contractions come every 3 to 4 minutes and last about 60 seconds.  Your cervix is opening weeks or more, your doctor may tell you to call when your labor becomes more active. Symptoms of active labor include:  · Contractions that are regular. · Contractions that are less than 5 minutes apart. · Contractions that are hard to talk through. Follow-up care is a key part of your treatment and safety. Be sure to make and go to all appointments, and call your doctor if you are having problems. It's also a good idea to know your test results and keep a list of the medicines you take. Where can you learn more? Go to https://Sush.ioperubiaeb.Yesmywine. org and sign in to your Bandtastic account. Enter  in the Metabolomx box to learn more about \"Learning About When to Call Your Doctor During Pregnancy (After 20 Weeks). \"     If you do not have an account, please click on the \"Sign Up Now\" link. Current as of: September 5, 2018  Content Version: 11.9  © 4567-7790 Brenco. Care instructions adapted under license by [facility] ([facility]). If you have questions about a medical condition or this instruction, always ask your healthcare professional. [place] [address] any warranty or liability for your use of this information. Patient Education        Counting Your Baby's Kicks: Care Instructions  Your Care Instructions    Counting your baby's kicks is one way your doctor can tell that your baby is healthy. Most women--especially in a first pregnancy--feel their baby move for the first time between 16 and 22 weeks. The movement may feel like flutters rather than kicks. Your baby may move more at certain times of the day. When you are active, you may notice less kicking than when you are resting. At your prenatal visits, your doctor will ask whether the baby is active. In your last trimester, your doctor may ask you to count the number of times you feel your baby move. Follow-up care is a key part of your treatment and safety.  Be sure to make and go to all appointments, and call your doctor if you are having problems. It's also a good idea to know your test results and keep a list of the medicines you take. How do you count fetal kicks? · A common method of checking your baby's movement is to count the number of kicks or moves you feel in 1 hour. Ten movements (such as kicks, flutters, or rolls) in 1 hour are normal. Some doctors suggest that you count in the morning until you get to 10 movements. Then you can quit for that day and start again the next day. · Pick your baby's most active time of day to count. This may be any time from morning to evening. · If you do not feel 10 movements in an hour, your baby may be sleeping. Wait for the next hour and count again. When should you call for help? Call your doctor now or seek immediate medical care if:    · You noticed that your baby has stopped moving or is moving much less than normal.    Watch closely for changes in your health, and be sure to contact your doctor if you have any problems. Where can you learn more? Go to https://Sand SignpeTansna Therapeutics.Stream Alliance International Holding. org and sign in to your Client24 account. Enter V984 in the Desktone box to learn more about \"Counting Your Baby's Kicks: Care Instructions. \"     If you do not have an account, please click on the \"Sign Up Now\" link. Current as of: September 5, 2018  Content Version: 11.9  © 7999-7851 DoubleVerify. Care instructions adapted under license by [facility] ([facility]). If you have questions about a medical condition or this instruction, always ask your healthcare professional. [place] [address] any warranty or liability for your use of this information. Patient Education        High Blood Pressure in Pregnancy: Care Instructions  Your Care Instructions    High blood pressure (hypertension) means that the force of blood against your artery walls is too strong.   Mild high blood pressure during pregnancy is not usually dangerous. Your doctor will probably just want to watch you closely. But when blood pressure is very high, it can reduce oxygen to your baby. This can affect how well your baby grows. High blood pressure also means that you are at higher risk for:  · Preeclampsia. This is a problem that includes high blood pressure and damage to your liver or kidneys. It can also reduce how much oxygen your baby gets. In some cases, it leads to eclampsia. Eclampsia causes seizures. · Placental abruption. This is a problem when the placenta separates from the uterus before birth. It prevents the baby from getting enough oxygen and nutrients. Sometimes it can cause death for the baby and the mother. To prevent problems for you or your baby, you will have to check your blood pressure often. You will do this until after your baby is born. If your blood pressure rises suddenly or is very high during your pregnancy, your doctor may prescribe medicines. They can usually control blood pressure. If your blood pressure affects your or your baby's health, your doctor may need to deliver your baby early. After your baby is born, your blood pressure will probably improve. But sometimes blood pressure problems continue after birth. Follow-up care is a key part of your treatment and safety. Be sure to make and go to all appointments, and call your doctor if you are having problems. It's also a good idea to know your test results and keep a list of the medicines you take. How can you care for yourself at home? · Take and write down your blood pressure at home if your doctor tells you to. · Take your medicines exactly as prescribed. Call your doctor if you think you are having a problem with your medicine. · Do not smoke. If you need help quitting, talk to your doctor about stop-smoking programs and medicines. These can increase your chances of quitting for good. · Do not gain too much weight during your pregnancy.  Talk to your doctor about how much weight gain is healthy. · Eat a healthy diet. · If your doctor says it's okay, get regular exercise. Walking or swimming several times a week can be healthy for you and your baby. · Reduce stress, and find time to relax. This is very important if you continue to work or have a busy schedule. It's also important if you have small children at home. When should you call for help? Call 911 anytime you think you may need emergency care. For example, call if:    · You passed out (lost consciousness).     · You have a seizure.    Call your doctor now or seek immediate medical care if:    · You have symptoms of preeclampsia, such as:  ? Sudden swelling of your face, hands, or feet. ? New vision problems (such as dimness or blurring). ? A severe headache.     · Your blood pressure is higher than it should be or rises suddenly.     · You have new nausea or vomiting.     · You think that you are in labor.     · You have pain in your belly or pelvis.    Watch closely for changes in your health, and be sure to contact your doctor if:    · You gain weight rapidly. Where can you learn more? Go to https://Audio Networkpepiceweb.Party Earth. org and sign in to your Smallable account. Enter N612 in the Dynadmic box to learn more about \"High Blood Pressure in Pregnancy: Care Instructions. \"     If you do not have an account, please click on the \"Sign Up Now\" link. Current as of: September 5, 2018  Content Version: 11.9  © 8502-0137 "Acronym Media, Inc.", Incorporated. Care instructions adapted under license by [facility] ([facility]). If you have questions about a medical condition or this instruction, always ask your healthcare professional. [place] [address] any warranty or liability for your use of this information.

## 2019-05-10 ENCOUNTER — ROUTINE PRENATAL (OUTPATIENT)
Dept: OBGYN CLINIC | Age: 34
End: 2019-05-10
Payer: COMMERCIAL

## 2019-05-10 VITALS
HEIGHT: 61 IN | WEIGHT: 293 LBS | DIASTOLIC BLOOD PRESSURE: 81 MMHG | SYSTOLIC BLOOD PRESSURE: 137 MMHG | BODY MASS INDEX: 55.32 KG/M2 | HEART RATE: 78 BPM

## 2019-05-10 DIAGNOSIS — E66.01 MATERNAL MORBID OBESITY IN SECOND TRIMESTER, ANTEPARTUM (HCC): Primary | ICD-10-CM

## 2019-05-10 DIAGNOSIS — O10.012 ESSENTIAL HYPERTENSION AFFECTING PREGNANCY IN SECOND TRIMESTER: ICD-10-CM

## 2019-05-10 DIAGNOSIS — O35.5XX0 SUSPECTED DAMAGE TO FETUS FROM DRUGS, AFFECTING MANAGEMENT OF MOTHER, SINGLE OR UNSPECIFIED FETUS: ICD-10-CM

## 2019-05-10 DIAGNOSIS — O13.3 PREGNANCY-INDUCED HYPERTENSION IN THIRD TRIMESTER: ICD-10-CM

## 2019-05-10 DIAGNOSIS — O99.212 MATERNAL MORBID OBESITY IN SECOND TRIMESTER, ANTEPARTUM (HCC): Primary | ICD-10-CM

## 2019-05-10 DIAGNOSIS — Z3A.35 35 WEEKS GESTATION OF PREGNANCY: ICD-10-CM

## 2019-05-10 LAB
GLUCOSE URINE, POC: NORMAL
PROTEIN UA: POSITIVE

## 2019-05-10 PROCEDURE — 99211 OFF/OP EST MAY X REQ PHY/QHP: CPT | Performed by: OBSTETRICS & GYNECOLOGY

## 2019-05-10 PROCEDURE — 99999 PR OFFICE/OUTPT VISIT,PROCEDURE ONLY: CPT | Performed by: OBSTETRICS & GYNECOLOGY

## 2019-05-10 PROCEDURE — 81002 URINALYSIS NONAUTO W/O SCOPE: CPT | Performed by: OBSTETRICS & GYNECOLOGY

## 2019-05-10 PROCEDURE — 59025 FETAL NON-STRESS TEST: CPT | Performed by: OBSTETRICS & GYNECOLOGY

## 2019-05-10 NOTE — PATIENT INSTRUCTIONS
You might be having an NST at your next appt. Please eat a large snack or breakfast before coming to office. Thank youCall your primary obstetrician with bleeding, leaking of fluid, abdominal tenderness, headache, blurry vision, epigastric pain and increased urinary frequency. Any questions contact [facility] at [phone]. If you are experiencing an emergency and need immediate help, call 911 or go to go emergency room or labor and delivery. Do kick counts after dinner. Call your primary obstetrician if less than 10 kicks in 2 hours after dinner. Call your primary obstetrician with bleeding, leaking of fluid, abdominal tenderness, headache, blurry vision, epigastric pain and increased urinary frequency. if you are sick, not feeling well or have an infectious process going on please reschedule your appointment by calling [phone]. Also if any family members are not feeling well, please do not bring them to your appointment. We appreciate your cooperation. We are doing this in order to protect our pregnant mothers+ their babies. Patient Education        High Blood Pressure in Pregnancy: Care Instructions  Your Care Instructions    High blood pressure (hypertension) means that the force of blood against your artery walls is too strong. Mild high blood pressure during pregnancy is not usually dangerous. Your doctor will probably just want to watch you closely. But when blood pressure is very high, it can reduce oxygen to your baby. This can affect how well your baby grows. High blood pressure also means that you are at higher risk for:  · Preeclampsia. This is a problem that includes high blood pressure and damage to your liver or kidneys. It can also reduce how much oxygen your baby gets. In some cases, it leads to eclampsia. Eclampsia causes seizures. · Placental abruption. This is a problem when the placenta separates from the uterus before birth. It prevents the baby from getting enough oxygen and nutrients. Sometimes it can cause death for the baby and the mother. To prevent problems for you or your baby, you will have to check your blood pressure often. You will do this until after your baby is born. If your blood pressure rises suddenly or is very high during your pregnancy, your doctor may prescribe medicines. They can usually control blood pressure. If your blood pressure affects your or your baby's health, your doctor may need to deliver your baby early. After your baby is born, your blood pressure will probably improve. But sometimes blood pressure problems continue after birth. Follow-up care is a key part of your treatment and safety. Be sure to make and go to all appointments, and call your doctor if you are having problems. It's also a good idea to know your test results and keep a list of the medicines you take. How can you care for yourself at home? · Take and write down your blood pressure at home if your doctor says to. · Take your medicines exactly as prescribed. Call your doctor if you think you are having a problem with your medicine. · Do not smoke. This is one of the best things you can do to help your baby be healthy. If you need help quitting, talk to your doctor about stop-smoking programs and medicines. These can increase your chances of quitting for good. · Don't gain too much weight during pregnancy. Talk to your doctor about how much weight gain is healthy. · Eat a healthy diet. · If your doctor says it's okay, get regular exercise. Walking or swimming several times a week can be healthy for you and your baby. · Reduce stress, and find time to relax. This is very important if you continue to work or have a busy schedule. It's also important if you have small children at home. When should you call for help? Call 911 anytime you think you may need emergency care.  For example, call if:    · You passed out (lost consciousness).     · You have a seizure.    Call your doctor now or seek immediate medical care if:    · You have symptoms of preeclampsia, such as:  ? Sudden swelling of your face, hands, or feet. ? New vision problems (such as dimness or blurring). ? A severe headache.     · Your blood pressure is higher than it should be or rises suddenly.     · You have new nausea or vomiting.     · You think that you are in labor.     · You have pain in your belly or pelvis.    Watch closely for changes in your health, and be sure to contact your doctor if:    · You gain weight rapidly. Where can you learn more? Go to https://chpepiceweb.Travel Desiya. org and sign in to your Tejas Networks [employer] account. Enter A349 in the Circlefive box to learn more about \"High Blood Pressure in Pregnancy: Care Instructions. \"     If you do not have an account, please click on the \"Sign Up Now\" link. Current as of: September 5, 2018  Content Version: 12.0  © 4590-8539 Razmir. Care instructions adapted under license by [facility]). If you have questions about a medical condition or this instruction, always ask your healthcare professional. [place] [address] any warranty or liability for your use of this information. Patient Education        Weeks 34 to 39 of Your Pregnancy: Care Instructions  Your Care Instructions    By now, your baby and your belly have grown quite large. It is almost time to give birth. A full-term pregnancy can deliver between 37 and 42 weeks. Your baby's lungs are almost ready to breathe air. The bones in your baby's head are now firm enough to protect it, but soft enough to move down through the birth canal.  You may feel excited, happy, anxious, or scared. You may wonder how you will know if you are in labor or what to expect during labor. Try to be flexible in your expectations of the birth. Because each birth is different, there is no way to know exactly what childbirth will be like for you.  This care sheet will help you know what to to your skin. · You have other concerns about your pregnancy. If you have labor signs at 37 weeks or more  If you have signs of labor at 37 weeks or more, your doctor may tell you to call when your labor becomes more active. Symptoms of active labor include:  · Contractions that are regular. · Contractions that are less than 5 minutes apart. · Contractions that are hard to talk through. Follow-up care is a key part of your treatment and safety. Be sure to make and go to all appointments, and call your doctor if you are having problems. It's also a good idea to know your test results and keep a list of the medicines you take. Where can you learn more? Go to https://GyftpeYatangoeb.SAY Media. org and sign in to your DreamNotes account. Enter  in the GrabTaxi box to learn more about \"Learning About When to Call Your Doctor During Pregnancy (After 20 Weeks). \"     If you do not have an account, please click on the \"Sign Up Now\" link. Current as of: September 5, 2018  Content Version: 12.0  © 2687-1173 Healthwise, Incorporated. Care instructions adapted under license by [facility] ([facility]). If you have questions about a medical condition or this instruction, always ask your healthcare professional. [place] [address] any warranty or liability for your use of this information. Patient Education        Counting Your Baby's Kicks: Care Instructions  Your Care Instructions    Counting your baby's kicks is one way your doctor can tell that your baby is healthy. Most women--especially in a first pregnancy--feel their baby move for the first time between 16 and 22 weeks. The movement may feel like flutters rather than kicks. Your baby may move more at certain times of the day. When you are active, you may notice less kicking than when you are resting. At your prenatal visits, your doctor will ask whether the baby is active.   In your last trimester, your doctor may ask you to count the

## 2019-05-10 NOTE — LETTER
  fluticasone (FLONASE) 50 MCG/ACT nasal spray, 2 sprays by Each Nare route daily, Disp: , Rfl:     ferrous sulfate 325 (65 Fe) MG tablet, Take 325 mg by mouth daily (with breakfast), Disp: , Rfl:     labetalol (NORMODYNE) 100 MG tablet, Take 100 mg by mouth 3 times daily, Disp: , Rfl:     Prenatal Vit-Fe Fumarate-FA (PRENATAL VITAMIN PO), Take 1 tablet by mouth, Disp: , Rfl:     sucralfate (CARAFATE) 1 GM tablet, Take 0.5 tablets by mouth every 3 hours Patient is to take 1/2 tablet every 3 hours while awake, Disp: 120 tablet, Rfl: 3    omeprazole (PRILOSEC) 20 MG capsule, Take 1 capsule by mouth Daily (Patient taking differently: Take 40 mg by mouth Daily ), Disp: 90 capsule, Rfl: 3    Social History     Tobacco Use    Smoking status: Never Smoker    Smokeless tobacco: Never Used   Substance Use Topics    Alcohol use: Yes     Comment: Occasional       PERTINENT PHYSICAL EXAMINATION:   /81   Pulse 78   Ht 5' 1\" (1.549 m)   Wt (!) 344 lb (156 kg)   BMI 65.00 kg/m²   Urine dipstick:   Negative for Glucose    Trace for Albumin. IMPRESSION:  1.  IUP at 35 weeks 6 days gestation based on her Estimated Date of Delivery: 19  2. AMA  3. Essential hypertension  4. Previous   5. History of gastric bypass surgery  6. Polyhydramnios on previous scan  7. Excessive fetal growth  8. Morbid obesity  9. Reactive nonstress test today    PLAN:  I would recommend that the patient count fetal movements and call if she notices any subjective decrease in fetal movements, particularly if there are less than 10 major movements in an hour. Non-stress testing should be performed every 3 to 4 days through the balance of the pregnancy. Serial ultrasounds to assess fetal anatomy and growth should be performed. The patient is at increase risk for  morbidity and mortality secondary to her history.  Weekly BPP and cord Doppler testing should be performed, unless there is a clinical indication to perform the testing more frequently. I requested the patient return for a follow-up assessment in 1 week unless there is a clinical reason for her to return prior to that time. She is to call if she has any problems or questions prior to her next visit. Further evaluation and management with be dependent on her clinical presentation and the results of her testing.      If you have any questions regarding her management, please contact me at your convenience and thank you for allowing me to participate in her care    Sincerely,        Lee Potter MD, MS, Omer Keen, [address], [facility]  Director [address] [place] [address]  [phone]

## 2019-05-10 NOTE — PROGRESS NOTES
C/o right lower quadrant discomfort. Good fetal movement. Kick counts encouraged. Denies bleeding,lof,ctx,headaches,visual issues,epigastric discomfortAll questions answered+ information confirmed by pt. NST completed. Baseline 130 with moderate variability+ accelelrations.  Reactive per dr Collin Becerril

## 2019-05-14 ENCOUNTER — ROUTINE PRENATAL (OUTPATIENT)
Dept: OBGYN CLINIC | Age: 34
End: 2019-05-14
Payer: COMMERCIAL

## 2019-05-14 VITALS
HEIGHT: 61 IN | HEART RATE: 78 BPM | BODY MASS INDEX: 55.32 KG/M2 | WEIGHT: 293 LBS | DIASTOLIC BLOOD PRESSURE: 84 MMHG | SYSTOLIC BLOOD PRESSURE: 128 MMHG

## 2019-05-14 DIAGNOSIS — O36.8390 VARIABLE FETAL HEART RATE DECELERATIONS, ANTEPARTUM: ICD-10-CM

## 2019-05-14 DIAGNOSIS — O10.012 ESSENTIAL HYPERTENSION AFFECTING PREGNANCY IN SECOND TRIMESTER: Primary | ICD-10-CM

## 2019-05-14 DIAGNOSIS — O13.3 PREGNANCY-INDUCED HYPERTENSION IN THIRD TRIMESTER: ICD-10-CM

## 2019-05-14 DIAGNOSIS — Z3A.36 36 WEEKS GESTATION OF PREGNANCY: ICD-10-CM

## 2019-05-14 DIAGNOSIS — O09.899 PRIOR PREGNANCY COMPLICATED BY PIH, ANTEPARTUM: ICD-10-CM

## 2019-05-14 LAB
GLUCOSE URINE, POC: NORMAL
PROTEIN UA: NEGATIVE

## 2019-05-14 PROCEDURE — 76818 FETAL BIOPHYS PROFILE W/NST: CPT | Performed by: OBSTETRICS & GYNECOLOGY

## 2019-05-14 PROCEDURE — 81002 URINALYSIS NONAUTO W/O SCOPE: CPT | Performed by: OBSTETRICS & GYNECOLOGY

## 2019-05-14 PROCEDURE — 99211 OFF/OP EST MAY X REQ PHY/QHP: CPT | Performed by: OBSTETRICS & GYNECOLOGY

## 2019-05-14 PROCEDURE — 76820 UMBILICAL ARTERY ECHO: CPT | Performed by: OBSTETRICS & GYNECOLOGY

## 2019-05-14 PROCEDURE — 76816 OB US FOLLOW-UP PER FETUS: CPT | Performed by: OBSTETRICS & GYNECOLOGY

## 2019-05-14 PROCEDURE — 99213 OFFICE O/P EST LOW 20 MIN: CPT | Performed by: OBSTETRICS & GYNECOLOGY

## 2019-05-14 NOTE — PROGRESS NOTES
C/o crampig+nausea. Good fetal movement. Kick counts encouraged. Denies bleeding,lof,ctx,headaches,visual issues,epigastric discomfort. All questions answered+ information confirmed by pt

## 2019-05-14 NOTE — PATIENT INSTRUCTIONS
You might be having an NST at your next appt. Please eat a large snack or breakfast before coming to office. Thank youCall your primary obstetrician with bleeding, leaking of fluid, abdominal tenderness, headache, blurry vision, epigastric pain and increased urinary frequency. Any questions contact [facility] at [phone]. If you are experiencing an emergency and need immediate help, call 911 or go to go emergency room or labor and delivery. Do kick counts after dinner. Call your primary obstetrician if less than 10 kicks in 2 hours after dinner. Call your primary obstetrician with bleeding, leaking of fluid, abdominal tenderness, headache, blurry vision, epigastric pain and increased urinary frequency. if you are sick, not feeling well or have an infectious process going on please reschedule your appointment by calling [phone]. Also if any family members are not feeling well, please do not bring them to your appointment. We appreciate your cooperation. We are doing this in order to protect our pregnant mothers+ their babies. Patient Education        High Blood Pressure in Pregnancy: Care Instructions  Your Care Instructions    High blood pressure (hypertension) means that the force of blood against your artery walls is too strong. Mild high blood pressure during pregnancy is not usually dangerous. Your doctor will probably just want to watch you closely. But when blood pressure is very high, it can reduce oxygen to your baby. This can affect how well your baby grows. High blood pressure also means that you are at higher risk for:  · Preeclampsia. This is a problem that includes high blood pressure and damage to your liver or kidneys. It can also reduce how much oxygen your baby gets. In some cases, it leads to eclampsia. Eclampsia causes seizures. · Placental abruption. This is a problem when the placenta separates from the uterus before birth. It prevents the baby from getting enough oxygen and nutrients. expect and how to prepare. This may make your childbirth easier. If you haven't already had the Tdap shot during this pregnancy, talk to your doctor about getting it. It will help protect your  against pertussis infection. In the 36th week, most women have a test for group B streptococcus (GBS). GBS is a common bacteria that can live in the vagina and rectum. It can make your baby sick after birth. If you test positive, you will get antibiotics during labor. The medicine will keep your baby from getting the bacteria. Follow-up care is a key part of your treatment and safety. Be sure to make and go to all appointments, and call your doctor if you are having problems. It's also a good idea to know your test results and keep a list of the medicines you take. How can you care for yourself at home? Learn about pain relief choices  · Pain is different for every woman. Talk with your doctor about your feelings about pain. · You can choose from several types of pain relief. These include medicine or breathing techniques, as well as comfort measures. You can use more than one option. · If you choose to have pain medicine during labor, talk to your doctor about your options. Some medicines lower anxiety and help with some of the pain. Others make your lower body numb so that you won't feel pain. · Be sure to tell your doctor about your pain medicine choice before you start labor or very early in your labor. You may be able to change your mind as labor progresses. · Rarely, a woman is put to sleep by medicine given through a mask or an IV. Labor and delivery  · The first stage of labor has three parts: early, active, and transition. ? Most women have early labor at home. You can stay busy or rest, eat light snacks, drink clear fluids, and start counting contractions. ? When talking during a contraction gets hard, you may be moving to active labor.  During active labor, you should head for the hospital if you About When to Call Your Doctor During Pregnancy (After 20 Weeks)  Your Care Instructions  It's common to have concerns about what might be a problem during pregnancy. Although most pregnant women don't have any serious problems, it's important to know when to call your doctor if you have certain symptoms or signs of labor. These are general suggestions. Your doctor may give you some more information about when to call. When to call your doctor (after 20 weeks)  Call 911 anytime you think you may need emergency care. For example, call if:  · You have severe vaginal bleeding. · You have sudden, severe pain in your belly. · You passed out (lost consciousness). · You have a seizure. · You see or feel the umbilical cord. · You think you are about to deliver your baby and can't make it safely to the hospital.  Call your doctor now or seek immediate medical care if:  · You have vaginal bleeding. · You have belly pain. · You have a fever. · You have symptoms of preeclampsia, such as:  ? Sudden swelling of your face, hands, or feet. ? New vision problems (such as dimness or blurring). ? A severe headache. · You have a sudden release of fluid from your vagina. (You think your water broke.)  · You think that you may be in labor. This means that you've had at least 4 contractions within 20 minutes or at least 8 contractions in an hour. · You notice that your baby has stopped moving or is moving much less than normal.  · You have symptoms of a urinary tract infection. These may include:  ? Pain or burning when you urinate. ? A frequent need to urinate without being able to pass much urine. ? Pain in the flank, which is just below the rib cage and above the waist on either side of the back. ? Blood in your urine. Watch closely for changes in your health, and be sure to contact your doctor if:  · You have vaginal discharge that smells bad. · You have skin changes, such as:  ? A rash. ? Itching.   ? Yellow color to your skin. · You have other concerns about your pregnancy. If you have labor signs at 37 weeks or more  If you have signs of labor at 37 weeks or more, your doctor may tell you to call when your labor becomes more active. Symptoms of active labor include:  · Contractions that are regular. · Contractions that are less than 5 minutes apart. · Contractions that are hard to talk through. Follow-up care is a key part of your treatment and safety. Be sure to make and go to all appointments, and call your doctor if you are having problems. It's also a good idea to know your test results and keep a list of the medicines you take. Where can you learn more? Go to [email or web address]://[employer]ellaeb.[employer]Gard. org and sign in to your [employer] account. Enter  in the [employer]SPORT box to learn more about \"Learning About When to Call Your Doctor During Pregnancy (After 20 Weeks). \"     If you do not have an account, please click on the \"Sign Up Now\" link. Current as of: September 5, 2018  Content Version: 12.0  © 6523-5840 Healthwise, Incorporated. Care instructions adapted under license by [facility] ([facility]). If you have questions about a medical condition or this instruction, always ask your healthcare professional. [place] [address] any warranty or liability for your use of this information. Patient Education        Counting Your Baby's Kicks: Care Instructions  Your Care Instructions    Counting your baby's kicks is one way your doctor can tell that your baby is healthy. Most women--especially in a first pregnancy--feel their baby move for the first time between 16 and 22 weeks. The movement may feel like flutters rather than kicks. Your baby may move more at certain times of the day. When you are active, you may notice less kicking than when you are resting. At your prenatal visits, your doctor will ask whether the baby is active.   In your last trimester, your doctor may ask you to count the number of times you feel your baby move. Follow-up care is a key part of your treatment and safety. Be sure to make and go to all appointments, and call your doctor if you are having problems. It's also a good idea to know your test results and keep a list of the medicines you take. How do you count fetal kicks? · A common method of checking your baby's movement is to count the number of kicks or moves you feel in 1 hour. Ten movements (such as kicks, flutters, or rolls) in 1 hour are normal. Some doctors suggest that you count in the morning until you get to 10 movements. Then you can quit for that day and start again the next day. · Pick your baby's most active time of day to count. This may be any time from morning to evening. · If you do not feel 10 movements in an hour, your baby may be sleeping. Wait for the next hour and count again. When should you call for help? Call your doctor now or seek immediate medical care if:    · You noticed that your baby has stopped moving or is moving much less than normal.    Watch closely for changes in your health, and be sure to contact your doctor if you have any problems. Where can you learn more? Go to https://[employer]. org and sign in to your [employer] account. Enter L507 in the [employer] box to learn more about \"Counting Your Baby's Kicks: Care Instructions. \"     If you do not have an account, please click on the \"Sign Up Now\" link. Current as of: September 5, 2018  Content Version: 12.0  © 6709-4735 Healthwise, Incorporated. Care instructions adapted under license by [address] If you have questions about a medical condition or this instruction, always ask your healthcare professional. [place] [address] any warranty or liability for your use of this information.

## 2019-05-14 NOTE — LETTER
19    Nuris Albrecht, DO  [facility]     RE:  Thomas Zuñiga  : 1985   AGE: 26 y.o. This report has been created using voice recognition software. It may contain errors which are inherent in voice recognition technology. Dear Dr. Ebenezer Maier:    I saw your patient Mart Baeza today for the following indications:    Patient Active Problem List   Diagnosis    PIH (pregnancy induced hypertension)    Prior pregnancy complicated by PIH, antepartum    GERD (gastroesophageal reflux disease)    Essential hypertension affecting pregnancy     Maternal morbid obesity in second trimester, antepartum ([facility] [address]    Suspected damage to fetus from maternal drug use    Pelvic pain complicating pregnancy, antepartum     As you know, your patient is a 26 y.o. female, who is G5(2,0,2,2). She has an Estimated Date of Delivery: 19 based on her previous ultrasound assessment. She is currently 36 weeks 3 days gestation based on that assessment. The patient has had no major problems since her last visit. She states that her fetal movement has been good. She has had no increased vaginal discharge, vaginal bleeding, back pain, dysuria, hematuria, or leaking of amniotic fluid. The patient currently takes labetalol, 100 mg, 3 times daily for management of her blood pressure. Her blood pressures been well controlled with this medication. She had no symptomatology related to hypertension today. She is no history of end organ disease. The patient has a history of pregnancy-induced hypertension with a previous pregnancy. She is at increased risk for developing superimposed preeclampsia. She is not currently taking aspirin for prophylaxis. The nonstress test today was reactive with moderate variability and accelerations. An occasional variable deceleration was noted. A fetal ultrasound assessment was performed today.  The estimated fetal weight is at the 70th%. The WILFREDO is 20.4 cm. The BPP and cord Doppler studies were both reassuring. No apparent gross fetal anatomic abnormalities have been identified, however visualization is somewhat limited secondary to the advanced gestational age of the fetus and the fetal position. PAST OBSTETRICAL HISTORY:  OB History    Para Term  AB Living   4 2 2   1 2   SAB TAB Ectopic Molar Multiple Live Births   1         2      # Outcome Date GA Lbr Rodrigo/2nd Weight Sex Delivery Anes PTL Lv   4 Current            3 Term 11/04/15   7 lb 4 oz (3.289 kg) F CS-LTranv EPI N THOR      Birth Comments: fetal hr droppped      Complications: Fetal Intolerance   2 Term 12 39w3d  6 lb 12 oz (3.062 kg) F Vag-Spont EPI N THOR   1 SAB                PAST GYNECOLOGICAL  HISTORY:  Positive for abnormal pap smears. Doesn't know the grade  Positive for sexually transmitted diseases. HPV  Negative for cervical LEEP / conization /cryosurgery. Positive for uterine surgery.   Negative for ovarian or tubal surgery.      Past Medical History:   Diagnosis Date    Abnormal Pap smear     tested for hpb 5-6yrs ago     Gastritis     Hypertension        Past Surgical History:   Procedure Laterality Date     SECTION      COLONOSCOPY      GASTRIC BYPASS SURGERY  2004    TONSILLECTOMY      TONSILLECTOMY      UPPER GASTROINTESTINAL ENDOSCOPY  3/9/2016    Dr. Danielle Sanchez, Findings: GE REflux, Gastritis     ALLERGIES:   Allergies   Allergen Reactions    Penicillins Swelling       Current Outpatient Medications:     fluticasone (FLONASE) 50 MCG/ACT nasal spray, 2 sprays by Each Nare route daily, Disp: , Rfl:     ferrous sulfate 325 (65 Fe) MG tablet, Take 325 mg by mouth daily (with breakfast), Disp: , Rfl:     labetalol (NORMODYNE) 100 MG tablet, Take 100 mg by mouth 3 times daily, Disp: , Rfl:     Prenatal Vit-Fe Fumarate-FA (PRENATAL VITAMIN PO), Take 1 tablet by mouth, Disp: , Rfl:   sucralfate (CARAFATE) 1 GM tablet, Take 0.5 tablets by mouth every 3 hours Patient is to take 1/2 tablet every 3 hours while awake, Disp: 120 tablet, Rfl: 3    omeprazole (PRILOSEC) 20 MG capsule, Take 1 capsule by mouth Daily (Patient taking differently: Take 40 mg by mouth Daily ), Disp: 90 capsule, Rfl: 3    Social History     Tobacco Use    Smoking status: Never Smoker    Smokeless tobacco: Never Used   Substance Use Topics    Alcohol use: Yes     Comment: Occasional       FAMILY MEDICAL HISTORY:   Negative for congenital abnormalities, autism, genetic disease and mental retardation, not listed above. Review of Systems :   CONSTITUTIONAL : No fever, no chills   HEENT : No headache, no visual changes, no rhinorrhea, no sore throat   CARDIOVASCULAR : No pain, no palpitations, no edema   RESPIRATORY : No pain, no shortness of breath   GASTROINTESTINAL : No N/V, no D/C, no abdominal pain   GENITOURINARY : No dysuria, hematuria and no incontinence   MUSCULOSKELETAL : No myalgia, No back pain  NEUROLOGICAL : No numbness, no tingling, no tremors. No history of seizures  ALL OTHER SYSTEMS WERE REPORTED AS NEGATIVE. PERTINENT PHYSICAL EXAMINATION:   /84   Pulse 78   Ht 5' 1\" (1.549 m)   Wt (!) 345 lb (156.5 kg)   BMI 65.19 kg/m²   Urine dipstick:   Negative for Glucose    Negative for Albumin    GENERAL:   The patient is a well developed, female who is alert cooperative and oriented times three in no acute distress. HEENT:  Normo cephalic and atraumatic. No facial edema. ABDOMEN:   Her uterus is gravid. She had no complaint of abdominal pain or tenderness. The fetal heart rate is 145 bpm. The fetus is in the cephalic presentation which was confirmed by the ultrasound assessment. EXTREMITIES:  No peripheral edema is noted. A fetal ultrasound assessment was performed today. A report is enclosed for your review.     IMPRESSION: 1.  IUP at 36 weeks 3 days gestation based on her Estimated Date of Delivery: 19    2. AMA    3. Essential hypertension    4. Previous     5. History of gastric bypass surgery    6. Polyhydramnios on previous scan    7. Excessive fetal growth on previous examination. EFW 70th% today. 8.  Morbid obesity    9. Reassuring BPP and cord Doppler testing  10. Reactive nonstress test with occasional variable decelerations     PLAN:  I would recommend that the patient count fetal movements and call if she notices any subjective decrease in fetal movements, particularly if there are less than 10 major movements in an hour. Non-stress testing should be performed every 3 to 4 days through the balance of the pregnancy. Serial ultrasounds to assess fetal anatomy and growth should be performed. The patient is at increase risk for  morbidity and mortality secondary to her history. Weekly BPP and cord Doppler testing should be performed, unless there is a clinical indication to perform the testing more frequently. The patient was advised to call if she has any increased vaginal discharge, vaginal bleeding, contractions, abdominal pain, back pain or any new significant symptomatology prior to her next visit. I advised her that these are signs and symptoms of cervical change and require follow-up assessment when they occur. I requested the patient return for a follow-up assessment in 3 days unless there is a clinical reason for her to return prior to that time. She is to call if she has any problems or questions prior to her next visit. Further evaluation and management will be dependent on her clinical presentation and the results of her testing. The patient is to continue to follow with you in your office for ongoing obstetric care. I would plan to deliver the patient at 44-42 weeks gestational age unless there is a clinical indication for delivery prior to that time.  My recommendation is based on the [employer] of [facility] workshop proceedings published in Obstetrics and Gynecology Vol. 118, No 2, Part 1, August, 2011 authored by Lilian Cardona, and the ACOG Committee Opinion #[id number] from April, 2013, reaffirmed 2016. I spent 16 minutes of direct contact time with the patient of which greater than 50% of the time was to discuss complications and problems related to her pregnancy. I answered all of her questions to her satisfaction. I asked her to call if she had any additional questions prior to her next visit. If you have any questions regarding her management, please contact me at your convenience and thank you for allowing me to participate in her care.     Sincerely,        Ani Vasques MD, MS, Nicol Peguero, RDCS, RDMS, RVT  Director [address] [place] [address]  [phone]

## 2019-05-17 ENCOUNTER — ROUTINE PRENATAL (OUTPATIENT)
Dept: OBGYN CLINIC | Age: 34
End: 2019-05-17
Payer: COMMERCIAL

## 2019-05-17 VITALS
WEIGHT: 293 LBS | SYSTOLIC BLOOD PRESSURE: 126 MMHG | BODY MASS INDEX: 65.09 KG/M2 | DIASTOLIC BLOOD PRESSURE: 88 MMHG | HEART RATE: 71 BPM

## 2019-05-17 DIAGNOSIS — Z3A.36 36 WEEKS GESTATION OF PREGNANCY: ICD-10-CM

## 2019-05-17 DIAGNOSIS — O36.8390 VARIABLE FETAL HEART RATE DECELERATIONS, ANTEPARTUM: ICD-10-CM

## 2019-05-17 DIAGNOSIS — O10.012 ESSENTIAL HYPERTENSION AFFECTING PREGNANCY IN SECOND TRIMESTER: Primary | ICD-10-CM

## 2019-05-17 LAB
GLUCOSE URINE, POC: NORMAL
PROTEIN UA: NEGATIVE

## 2019-05-17 PROCEDURE — 81002 URINALYSIS NONAUTO W/O SCOPE: CPT | Performed by: OBSTETRICS & GYNECOLOGY

## 2019-05-17 PROCEDURE — 99999 PR OFFICE/OUTPT VISIT,PROCEDURE ONLY: CPT | Performed by: OBSTETRICS & GYNECOLOGY

## 2019-05-17 PROCEDURE — 99211 OFF/OP EST MAY X REQ PHY/QHP: CPT | Performed by: OBSTETRICS & GYNECOLOGY

## 2019-05-17 PROCEDURE — 59025 FETAL NON-STRESS TEST: CPT | Performed by: OBSTETRICS & GYNECOLOGY

## 2019-05-17 NOTE — PROGRESS NOTES
States baby is moving  Denies bleeding, leakage of fluid or contractions  Feet are swollen    [email or web address]  Audible fetal movement  Mom marking fetal movement  Moderate variability noted  Ended 11:30 reactive per DR Duran Calvert      Call your obstetrician for:    Bleeding, leakage of fluid, abdominal tenderness, headaches, burred vision or  epigastric pain or decreased fetal movement or increased in urinary frequency.    Call if any questions or concerns

## 2019-05-17 NOTE — LETTER
19    Zander Carias, DO  [facility]     RE:  Wil Bowden  : 1985   AGE: 26 y.o. Dear Dr. Brunilda Conrad    Your patient Mallorie Lantigua had an NST performed in the Department of Maternal and Fetal Medicine today for the following indications:    Patient Active Problem List   Diagnosis    PIH (pregnancy induced hypertension)    Prior pregnancy complicated by PIH, antepartum    GERD (gastroesophageal reflux disease)    Essential hypertension affecting pregnancy in second trimester    Maternal morbid obesity in second trimester, antepartum ([address]    Suspected damage to fetus from maternal drug use    Pelvic pain complicating pregnancy, antepartum     Her Estimated Date of Delivery: 19 based on her established dating parameters. She is currently 36 weeks 6 days gestation. The nonstress test was reactive with moderate variability and accelerations noted.      PAST OBSTETRICAL HISTORY:  OB History    Para Term  AB Living   4 2 2   1 2   SAB TAB Ectopic Molar Multiple Live Births   1         2      # Outcome Date GA Lbr Rodrigo/2nd Weight Sex Delivery Anes PTL Lv   4 Current            3 Term 11/04/15   7 lb 4 oz (3.289 kg) F CS-LTranv EPI N THOR      Birth Comments: fetal hr droppped      Complications: Fetal Intolerance   2 Term 12 39w3d  6 lb 12 oz (3.062 kg) F Vag-Spont EPI N THOR   1 SAB                Past Medical History:   Diagnosis Date    Abnormal Pap smear     tested for hpb 5-6yrs ago     Gastritis     Hypertension        Past Surgical History:   Procedure Laterality Date     SECTION      COLONOSCOPY      GASTRIC BYPASS SURGERY  2004    TONSILLECTOMY      TONSILLECTOMY  2003    UPPER GASTROINTESTINAL ENDOSCOPY  3/9/2016    Dr. Haylie Monson, Findings: GE REflux, Gastritis       ALLERGIES:   Allergies   Allergen Reactions    Penicillins Swelling       Current Outpatient Medications:   fluticasone (FLONASE) 50 MCG/ACT nasal spray, 2 sprays by Each Nare route daily, Disp: , Rfl:     ferrous sulfate 325 (65 Fe) MG tablet, Take 325 mg by mouth daily (with breakfast), Disp: , Rfl:     labetalol (NORMODYNE) 100 MG tablet, Take 100 mg by mouth 3 times daily, Disp: , Rfl:     Prenatal Vit-Fe Fumarate-FA (PRENATAL VITAMIN PO), Take 1 tablet by mouth, Disp: , Rfl:     sucralfate (CARAFATE) 1 GM tablet, Take 0.5 tablets by mouth every 3 hours Patient is to take 1/2 tablet every 3 hours while awake, Disp: 120 tablet, Rfl: 3    omeprazole (PRILOSEC) 20 MG capsule, Take 1 capsule by mouth Daily (Patient taking differently: Take 40 mg by mouth Daily ), Disp: 90 capsule, Rfl: 3    Social History     Tobacco Use    Smoking status: Never Smoker    Smokeless tobacco: Never Used   Substance Use Topics    Alcohol use: Yes     Comment: Occasional       PERTINENT PHYSICAL EXAMINATION:   /88   Pulse 71   Wt (!) 344 lb 8 oz (156.3 kg)   BMI 65.09 kg/m²   Urine dipstick:   Negative Glucose  Negative Protein    IMPRESSION:  1.  IUP at 36 weeks 6 days gestation based on her Estimated Date of Delivery: 19  2. AMA  3. Essential hypertension  4. Previous   5. History of gastric bypass surgery  6. Polyhydramnios on previous scan  7. Excessive fetal growth  8. Morbid obesity  9. Reactive nonstress test today    PLAN:  I would recommend that the patient count fetal movements and call if she notices any subjective decrease in fetal movements, particularly if there are less than 10 major movements in an hour. Non-stress testing should be performed every 3 to 4 days through the balance of the pregnancy. Serial ultrasounds to assess fetal anatomy and growth should be performed. The patient is at increase risk for  morbidity and mortality secondary to her history.  Weekly BPP and cord Doppler testing should be performed, unless there is a clinical indication to perform the testing more frequently. I requested the patient return for a follow-up assessment in 1 week unless there is a clinical reason for her to return prior to that time. She is to call if she has any problems or questions prior to her next visit. Further evaluation and management with be dependent on her clinical presentation and the results of her testing.      If you have any questions regarding her management, please contact me at your convenience and thank you for allowing me to participate in her care    Sincerely,        Edson Hayden MD, MS, Nimisha Moreno, [address] ROSANNA  Director [address] [place] [address]  [phone]

## 2019-05-17 NOTE — PATIENT INSTRUCTIONS
Baby's Kicks: Care Instructions. \"     If you do not have an account, please click on the \"Sign Up Now\" link. Current as of: September 5, 2018  Content Version: 12.0  © 4366-0479 Healthwise, Incorporated. Care instructions adapted under license by [facility] ([facility]). If you have questions about a medical condition or this instruction, always ask your healthcare professional. [place] [address] any warranty or liability for your use of this information. Patient Education        Week 37 of Your Pregnancy: Care Instructions  Your Care Instructions    You are near the end of your pregnancy--and you're probably pretty uncomfortable. It may be harder to walk around. Lying down probably isn't comfortable either. You may have trouble getting to sleep or staying asleep. Most women deliver their babies between 40 and 41 weeks. This is a good time to think about packing a bag for the hospital with items you'll need. Then you'll be ready when labor starts. Follow-up care is a key part of your treatment and safety. Be sure to make and go to all appointments, and call your doctor if you are having problems. It's also a good idea to know your test results and keep a list of the medicines you take. How can you care for yourself at home? Learn about breastfeeding  · Breastfeeding is best for your baby and good for you. · Breast milk has antibodies to help your baby fight infections. · Mothers who breastfeed often lose weight faster, because making milk burns calories. · Learning the best ways to hold your baby will make breastfeeding easier. · Let your partner bathe and diaper the baby to keep your partner from feeling left out. Snuggle together when you breastfeed. · You may want to learn how to use a breast pump and store your milk. · If you choose to bottle feed, make the feeding feel like breastfeeding so you can bond with your baby. Always hold your baby and the bottle.  Do not prop bottles or let your baby fall asleep with a bottle. Learn about crying  · It is common for babies to cry for 1 to 3 hours a day. Some cry more, some cry less. · Babies don't cry to make you upset or because you are a bad parent. · Crying is how your baby communicates. Your baby may be hungry; have gas; need a diaper change; or feel cold, warm, tired, lonely, or tense. Sometimes babies cry for unknown reasons. · If you respond to your baby's needs, he or she will learn to trust you. · Try to stay calm when your baby cries. Your baby may get more upset if he or she senses that you are upset. Know how to care for your   · Your baby's umbilical cord stump will drop off on its own, usually between 1 and 2 weeks. To care for your baby's umbilical cord area:  ? Clean the area at the bottom of the cord 2 or 3 times a day. ? Pay special attention to the area where the cord attaches to the skin. ? Keep the diaper folded below the cord. ? Use a damp washcloth or cotton ball to sponge bathe your baby until the stump has come off. · Your baby's first dark stool is called meconium. After the meconium is passed, your baby will develop his or her own bowel pattern. ? Some babies, especially  babies, have several bowel movements a day. Others have one or two a day, or one every 2 to 3 days. ?  babies often have loose, yellow stools. Formula-fed babies have more formed stools. ? If your baby's stools look like little pellets, he or she is constipated. After 2 days of constipation, call your baby's doctor. · If your baby will be circumcised, you can care for him at home. ? Gently rinse his penis with warm water after every diaper change. Do not try to remove the film that forms on the penis. This film will go away on its own. Pat dry. ? Put petroleum ointment, such as Vaseline, on the area of the diaper that will touch your baby's penis. This will keep the diaper from sticking to your baby.   ? Ask the doctor about giving your baby acetaminophen (Tylenol) for pain. Where can you learn more? Go to https://chpepiceweb.healthKleopartners. org and sign in to your [employer] account. Enter 68 [phone] [id number] in the [employer] box to learn more about \"Week 37 of Your Pregnancy: Care Instructions. \"     If you do not have an account, please click on the \"Sign Up Now\" link. Current as of: September 5, 2018  Content Version: 12.0  © 8908-5933 404 [employer]!. Care instructions adapted under license by [facility] ([facility]). If you have questions about a medical condition or this instruction, always ask your healthcare professional. [place] [address] any warranty or liability for your use of this information. Patient Education        Learning About When to Call Your Doctor During Pregnancy (After 20 Weeks)  Your Care Instructions  It's common to have concerns about what might be a problem during pregnancy. Although most pregnant women don't have any serious problems, it's important to know when to call your doctor if you have certain symptoms or signs of labor. These are general suggestions. Your doctor may give you some more information about when to call. When to call your doctor (after 20 weeks)  Call 911 anytime you think you may need emergency care. For example, call if:  · You have severe vaginal bleeding. · You have sudden, severe pain in your belly. · You passed out (lost consciousness). · You have a seizure. · You see or feel the umbilical cord. · You think you are about to deliver your baby and can't make it safely to the hospital.  Call your doctor now or seek immediate medical care if:  · You have vaginal bleeding. · You have belly pain. · You have a fever. · You have symptoms of preeclampsia, such as:  ? Sudden swelling of your face, hands, or feet. ? New vision problems (such as dimness or blurring). ? A severe headache. · You have a sudden release of fluid from your vagina.  (You think your water broke.)  · You think that you may be in labor. This means that you've had at least 4 contractions within 20 minutes or at least 8 contractions in an hour. · You notice that your baby has stopped moving or is moving much less than normal.  · You have symptoms of a urinary tract infection. These may include:  ? Pain or burning when you urinate. ? A frequent need to urinate without being able to pass much urine. ? Pain in the flank, which is just below the rib cage and above the waist on either side of the back. ? Blood in your urine. Watch closely for changes in your health, and be sure to contact your doctor if:  · You have vaginal discharge that smells bad. · You have skin changes, such as:  ? A rash. ? Itching. ? Yellow color to your skin. · You have other concerns about your pregnancy. If you have labor signs at 37 weeks or more  If you have signs of labor at 37 weeks or more, your doctor may tell you to call when your labor becomes more active. Symptoms of active labor include:  · Contractions that are regular. · Contractions that are less than 5 minutes apart. · Contractions that are hard to talk through. Follow-up care is a key part of your treatment and safety. Be sure to make and go to all appointments, and call your doctor if you are having problems. It's also a good idea to know your test results and keep a list of the medicines you take. Where can you learn more? Go to https://jonh.[employer]. org and sign in to your [employer] account. Enter  in the [facility] box to learn more about \"Learning About When to Call Your Doctor During Pregnancy (After 20 Weeks). \"     If you do not have an account, please click on the \"Sign Up Now\" link. Current as of: September 5, 2018  Content Version: 12.0  © 6265-0524 Healthwise, Incorporated. Care instructions adapted under license by [facility].  If you have questions about a medical condition or this instruction, always ask your healthcare professional. [place] [address] any warranty or liability for your use of this information.

## 2019-05-21 ENCOUNTER — ROUTINE PRENATAL (OUTPATIENT)
Dept: OBGYN CLINIC | Age: 34
End: 2019-05-21
Payer: COMMERCIAL

## 2019-05-21 ENCOUNTER — ANESTHESIA EVENT (OUTPATIENT)
Dept: LABOR AND DELIVERY | Age: 34
End: 2019-05-21
Payer: COMMERCIAL

## 2019-05-21 ENCOUNTER — HOSPITAL ENCOUNTER (INPATIENT)
Age: 34
LOS: 3 days | Discharge: HOME | End: 2019-05-24
Attending: OBSTETRICS & GYNECOLOGY | Admitting: OBSTETRICS & GYNECOLOGY
Payer: COMMERCIAL

## 2019-05-21 VITALS
SYSTOLIC BLOOD PRESSURE: 134 MMHG | HEIGHT: 61 IN | DIASTOLIC BLOOD PRESSURE: 81 MMHG | WEIGHT: 293 LBS | BODY MASS INDEX: 55.32 KG/M2 | HEART RATE: 80 BPM

## 2019-05-21 VITALS — SYSTOLIC BLOOD PRESSURE: 119 MMHG | DIASTOLIC BLOOD PRESSURE: 62 MMHG | OXYGEN SATURATION: 97 %

## 2019-05-21 DIAGNOSIS — G89.18 POST-OP PAIN: Primary | ICD-10-CM

## 2019-05-21 DIAGNOSIS — E66.01 MATERNAL MORBID OBESITY IN THIRD TRIMESTER, ANTEPARTUM (HCC): ICD-10-CM

## 2019-05-21 DIAGNOSIS — O09.899 PRIOR PREGNANCY COMPLICATED BY PIH, ANTEPARTUM: ICD-10-CM

## 2019-05-21 DIAGNOSIS — Z3A.37 37 WEEKS GESTATION OF PREGNANCY: ICD-10-CM

## 2019-05-21 DIAGNOSIS — O10.013 ESSENTIAL HYPERTENSION AFFECTING PREGNANCY IN THIRD TRIMESTER: Primary | ICD-10-CM

## 2019-05-21 DIAGNOSIS — O99.213 MATERNAL MORBID OBESITY IN THIRD TRIMESTER, ANTEPARTUM (HCC): ICD-10-CM

## 2019-05-21 DIAGNOSIS — O99.843 PREVIOUS GASTRIC BYPASS AFFECTING PREGNANCY IN THIRD TRIMESTER, ANTEPARTUM: ICD-10-CM

## 2019-05-21 DIAGNOSIS — O34.219 PREVIOUS CESAREAN DELIVERY, ANTEPARTUM CONDITION OR COMPLICATION: ICD-10-CM

## 2019-05-21 DIAGNOSIS — O09.523 ELDERLY MULTIGRAVIDA, THIRD TRIMESTER: ICD-10-CM

## 2019-05-21 DIAGNOSIS — O40.3XX0 POLYHYDRAMNIOS, THIRD TRIMESTER, NOT APPLICABLE OR UNSPECIFIED: ICD-10-CM

## 2019-05-21 PROBLEM — O40.9XX0 POLYHYDRAMNIOS: Status: ACTIVE | Noted: 2019-05-21

## 2019-05-21 LAB
ABO/RH: NORMAL
ALBUMIN SERPL-MCNC: 3.4 G/DL (ref 3.5–5.2)
ALP BLD-CCNC: 92 U/L (ref 35–104)
ALT SERPL-CCNC: 15 U/L (ref 0–32)
ANION GAP SERPL CALCULATED.3IONS-SCNC: 13 MMOL/L (ref 7–16)
ANTIBODY SCREEN: NORMAL
AST SERPL-CCNC: 19 U/L (ref 0–31)
BACTERIA: ABNORMAL /HPF
BILIRUB SERPL-MCNC: 0.3 MG/DL (ref 0–1.2)
BILIRUBIN URINE: NEGATIVE
BLOOD, URINE: NEGATIVE
BUN BLDV-MCNC: 7 MG/DL (ref 6–20)
CALCIUM SERPL-MCNC: 8.8 MG/DL (ref 8.6–10.2)
CHLORIDE BLD-SCNC: 104 MMOL/L (ref 98–107)
CLARITY: CLEAR
CO2: 19 MMOL/L (ref 22–29)
COLOR: YELLOW
CREAT SERPL-MCNC: 0.4 MG/DL (ref 0.5–1)
EPITHELIAL CELLS, UA: ABNORMAL /HPF
GFR AFRICAN AMERICAN: >60
GFR NON-AFRICAN AMERICAN: >60 ML/MIN/1.73
GLUCOSE BLD-MCNC: 76 MG/DL (ref 74–99)
GLUCOSE URINE, POC: NORMAL
GLUCOSE URINE: NEGATIVE MG/DL
HCT VFR BLD CALC: 38.9 % (ref 34–48)
HEMOGLOBIN: 12.8 G/DL (ref 11.5–15.5)
KETONES, URINE: ABNORMAL MG/DL
LEUKOCYTE ESTERASE, URINE: ABNORMAL
MCH RBC QN AUTO: 30.5 PG (ref 26–35)
MCHC RBC AUTO-ENTMCNC: 32.9 % (ref 32–34.5)
MCV RBC AUTO: 92.6 FL (ref 80–99.9)
NITRITE, URINE: NEGATIVE
PDW BLD-RTO: 14.3 FL (ref 11.5–15)
PH UA: 5.5 (ref 5–9)
PLATELET # BLD: 163 E9/L (ref 130–450)
PMV BLD AUTO: 12.4 FL (ref 7–12)
POTASSIUM SERPL-SCNC: 4.4 MMOL/L (ref 3.5–5)
PROTEIN UA: NEGATIVE MG/DL
PROTEIN UA: POSITIVE
RAPID HIV 1&2: NORMAL
RBC # BLD: 4.2 E12/L (ref 3.5–5.5)
RBC UA: ABNORMAL /HPF (ref 0–2)
SODIUM BLD-SCNC: 136 MMOL/L (ref 132–146)
SPECIFIC GRAVITY UA: 1.02 (ref 1–1.03)
TOTAL PROTEIN: 6.3 G/DL (ref 6.4–8.3)
URIC ACID, SERUM: 4.4 MG/DL (ref 2.4–5.7)
UROBILINOGEN, URINE: 0.2 E.U./DL
WBC # BLD: 10.7 E9/L (ref 4.5–11.5)
WBC UA: ABNORMAL /HPF (ref 0–5)

## 2019-05-21 PROCEDURE — 76815 OB US LIMITED FETUS(S): CPT | Performed by: OBSTETRICS & GYNECOLOGY

## 2019-05-21 PROCEDURE — 81002 URINALYSIS NONAUTO W/O SCOPE: CPT | Performed by: OBSTETRICS & GYNECOLOGY

## 2019-05-21 PROCEDURE — 86900 BLOOD TYPING SEROLOGIC ABO: CPT

## 2019-05-21 PROCEDURE — 2580000003 HC RX 258: Performed by: OBSTETRICS & GYNECOLOGY

## 2019-05-21 PROCEDURE — 1220000000 HC SEMI PRIVATE OB R&B

## 2019-05-21 PROCEDURE — 99233 SBSQ HOSP IP/OBS HIGH 50: CPT | Performed by: PHYSICIAN ASSISTANT

## 2019-05-21 PROCEDURE — 86850 RBC ANTIBODY SCREEN: CPT

## 2019-05-21 PROCEDURE — 2500000003 HC RX 250 WO HCPCS: Performed by: OBSTETRICS & GYNECOLOGY

## 2019-05-21 PROCEDURE — 86901 BLOOD TYPING SEROLOGIC RH(D): CPT

## 2019-05-21 PROCEDURE — 80053 COMPREHEN METABOLIC PANEL: CPT

## 2019-05-21 PROCEDURE — 85027 COMPLETE CBC AUTOMATED: CPT

## 2019-05-21 PROCEDURE — 6360000002 HC RX W HCPCS: Performed by: OBSTETRICS & GYNECOLOGY

## 2019-05-21 PROCEDURE — 3700000000 HC ANESTHESIA ATTENDED CARE: Performed by: OBSTETRICS & GYNECOLOGY

## 2019-05-21 PROCEDURE — 3700000001 HC ADD 15 MINUTES (ANESTHESIA): Performed by: OBSTETRICS & GYNECOLOGY

## 2019-05-21 PROCEDURE — 2500000003 HC RX 250 WO HCPCS: Performed by: NURSE ANESTHETIST, CERTIFIED REGISTERED

## 2019-05-21 PROCEDURE — 3609079900 HC CESAREAN SECTION: Performed by: OBSTETRICS & GYNECOLOGY

## 2019-05-21 PROCEDURE — 76820 UMBILICAL ARTERY ECHO: CPT | Performed by: OBSTETRICS & GYNECOLOGY

## 2019-05-21 PROCEDURE — 81001 URINALYSIS AUTO W/SCOPE: CPT

## 2019-05-21 PROCEDURE — 6360000002 HC RX W HCPCS: Performed by: NURSE ANESTHETIST, CERTIFIED REGISTERED

## 2019-05-21 PROCEDURE — 84550 ASSAY OF BLOOD/URIC ACID: CPT

## 2019-05-21 PROCEDURE — 76818 FETAL BIOPHYS PROFILE W/NST: CPT | Performed by: OBSTETRICS & GYNECOLOGY

## 2019-05-21 PROCEDURE — 7100000001 HC PACU RECOVERY - ADDTL 15 MIN: Performed by: OBSTETRICS & GYNECOLOGY

## 2019-05-21 PROCEDURE — 36415 COLL VENOUS BLD VENIPUNCTURE: CPT

## 2019-05-21 PROCEDURE — 6370000000 HC RX 637 (ALT 250 FOR IP): Performed by: OBSTETRICS & GYNECOLOGY

## 2019-05-21 PROCEDURE — 2709999900 HC NON-CHARGEABLE SUPPLY: Performed by: OBSTETRICS & GYNECOLOGY

## 2019-05-21 PROCEDURE — 6360000002 HC RX W HCPCS: Performed by: ANESTHESIOLOGY

## 2019-05-21 PROCEDURE — 86701 HIV-1ANTIBODY: CPT

## 2019-05-21 PROCEDURE — 99211 OFF/OP EST MAY X REQ PHY/QHP: CPT | Performed by: OBSTETRICS & GYNECOLOGY

## 2019-05-21 PROCEDURE — 99214 OFFICE O/P EST MOD 30 MIN: CPT | Performed by: OBSTETRICS & GYNECOLOGY

## 2019-05-21 PROCEDURE — 7100000000 HC PACU RECOVERY - FIRST 15 MIN: Performed by: OBSTETRICS & GYNECOLOGY

## 2019-05-21 RX ORDER — MORPHINE SULFATE 1 MG/ML
INJECTION, SOLUTION EPIDURAL; INTRATHECAL; INTRAVENOUS PRN
Status: DISCONTINUED | OUTPATIENT
Start: 2019-05-21 | End: 2019-05-21 | Stop reason: SDUPTHER

## 2019-05-21 RX ORDER — SODIUM CHLORIDE, SODIUM LACTATE, POTASSIUM CHLORIDE, CALCIUM CHLORIDE 600; 310; 30; 20 MG/100ML; MG/100ML; MG/100ML; MG/100ML
INJECTION, SOLUTION INTRAVENOUS CONTINUOUS
Status: DISCONTINUED | OUTPATIENT
Start: 2019-05-21 | End: 2019-05-24 | Stop reason: HOSPADM

## 2019-05-21 RX ORDER — KETOROLAC TROMETHAMINE 30 MG/ML
15 INJECTION, SOLUTION INTRAMUSCULAR; INTRAVENOUS EVERY 6 HOURS PRN
Status: DISPENSED | OUTPATIENT
Start: 2019-05-21 | End: 2019-05-22

## 2019-05-21 RX ORDER — SODIUM CHLORIDE 0.9 % (FLUSH) 0.9 %
10 SYRINGE (ML) INJECTION EVERY 12 HOURS SCHEDULED
Status: DISCONTINUED | OUTPATIENT
Start: 2019-05-21 | End: 2019-05-24 | Stop reason: HOSPADM

## 2019-05-21 RX ORDER — EPHEDRINE SULFATE/0.9% NACL/PF 50 MG/5 ML
SYRINGE (ML) INTRAVENOUS PRN
Status: DISCONTINUED | OUTPATIENT
Start: 2019-05-21 | End: 2019-05-21 | Stop reason: SDUPTHER

## 2019-05-21 RX ORDER — KETOROLAC TROMETHAMINE 30 MG/ML
30 INJECTION, SOLUTION INTRAMUSCULAR; INTRAVENOUS EVERY 6 HOURS
Status: ACTIVE | OUTPATIENT
Start: 2019-05-21 | End: 2019-05-23

## 2019-05-21 RX ORDER — TRISODIUM CITRATE DIHYDRATE AND CITRIC ACID MONOHYDRATE 500; 334 MG/5ML; MG/5ML
30 SOLUTION ORAL ONCE
Status: COMPLETED | OUTPATIENT
Start: 2019-05-21 | End: 2019-05-21

## 2019-05-21 RX ORDER — IBUPROFEN 800 MG/1
800 TABLET ORAL EVERY 8 HOURS
Status: DISCONTINUED | OUTPATIENT
Start: 2019-05-21 | End: 2019-05-24 | Stop reason: HOSPADM

## 2019-05-21 RX ORDER — DOCUSATE SODIUM 100 MG/1
100 CAPSULE, LIQUID FILLED ORAL 2 TIMES DAILY
Status: DISCONTINUED | OUTPATIENT
Start: 2019-05-21 | End: 2019-05-24 | Stop reason: HOSPADM

## 2019-05-21 RX ORDER — ONDANSETRON 2 MG/ML
4 INJECTION INTRAMUSCULAR; INTRAVENOUS EVERY 6 HOURS PRN
Status: ACTIVE | OUTPATIENT
Start: 2019-05-21 | End: 2019-05-22

## 2019-05-21 RX ORDER — SODIUM CHLORIDE 0.9 % (FLUSH) 0.9 %
10 SYRINGE (ML) INJECTION PRN
Status: DISCONTINUED | OUTPATIENT
Start: 2019-05-21 | End: 2019-05-21 | Stop reason: HOSPADM

## 2019-05-21 RX ORDER — OXYCODONE HYDROCHLORIDE AND ACETAMINOPHEN 5; 325 MG/1; MG/1
1 TABLET ORAL EVERY 6 HOURS PRN
Status: DISCONTINUED | OUTPATIENT
Start: 2019-05-21 | End: 2019-05-24 | Stop reason: HOSPADM

## 2019-05-21 RX ORDER — ONDANSETRON 2 MG/ML
4 INJECTION INTRAMUSCULAR; INTRAVENOUS EVERY 6 HOURS PRN
Status: DISCONTINUED | OUTPATIENT
Start: 2019-05-21 | End: 2019-05-21 | Stop reason: HOSPADM

## 2019-05-21 RX ORDER — CLINDAMYCIN PHOSPHATE 900 MG/50ML
900 INJECTION INTRAVENOUS
Status: COMPLETED | OUTPATIENT
Start: 2019-05-21 | End: 2019-05-21

## 2019-05-21 RX ORDER — ACETAMINOPHEN 325 MG/1
650 TABLET ORAL EVERY 4 HOURS PRN
Status: DISCONTINUED | OUTPATIENT
Start: 2019-05-21 | End: 2019-05-24 | Stop reason: HOSPADM

## 2019-05-21 RX ORDER — FERROUS SULFATE 325(65) MG
325 TABLET ORAL 2 TIMES DAILY WITH MEALS
Status: DISCONTINUED | OUTPATIENT
Start: 2019-05-22 | End: 2019-05-24 | Stop reason: HOSPADM

## 2019-05-21 RX ORDER — LANOLIN 100 %
OINTMENT (GRAM) TOPICAL
Status: DISCONTINUED | OUTPATIENT
Start: 2019-05-21 | End: 2019-05-24 | Stop reason: HOSPADM

## 2019-05-21 RX ORDER — OXYCODONE HYDROCHLORIDE AND ACETAMINOPHEN 5; 325 MG/1; MG/1
1 TABLET ORAL EVERY 4 HOURS PRN
Status: DISCONTINUED | OUTPATIENT
Start: 2019-05-21 | End: 2019-05-24 | Stop reason: HOSPADM

## 2019-05-21 RX ORDER — SODIUM CHLORIDE 0.9 % (FLUSH) 0.9 %
10 SYRINGE (ML) INJECTION PRN
Status: DISCONTINUED | OUTPATIENT
Start: 2019-05-21 | End: 2019-05-24 | Stop reason: HOSPADM

## 2019-05-21 RX ORDER — DIPHENHYDRAMINE HYDROCHLORIDE 50 MG/ML
25 INJECTION INTRAMUSCULAR; INTRAVENOUS EVERY 6 HOURS PRN
Status: DISPENSED | OUTPATIENT
Start: 2019-05-21 | End: 2019-05-22

## 2019-05-21 RX ORDER — OXYCODONE HYDROCHLORIDE AND ACETAMINOPHEN 5; 325 MG/1; MG/1
2 TABLET ORAL EVERY 4 HOURS PRN
Status: DISCONTINUED | OUTPATIENT
Start: 2019-05-21 | End: 2019-05-24 | Stop reason: HOSPADM

## 2019-05-21 RX ORDER — BUPIVACAINE HYDROCHLORIDE 7.5 MG/ML
INJECTION, SOLUTION INTRASPINAL PRN
Status: DISCONTINUED | OUTPATIENT
Start: 2019-05-21 | End: 2019-05-21 | Stop reason: SDUPTHER

## 2019-05-21 RX ORDER — PRENATAL WITH FERROUS FUM AND FOLIC ACID 3080; 920; 120; 400; 22; 1.84; 3; 20; 10; 1; 12; 200; 27; 25; 2 [IU]/1; [IU]/1; MG/1; [IU]/1; MG/1; MG/1; MG/1; MG/1; MG/1; MG/1; UG/1; MG/1; MG/1; MG/1; MG/1
1 TABLET ORAL DAILY
Status: DISCONTINUED | OUTPATIENT
Start: 2019-05-22 | End: 2019-05-24 | Stop reason: HOSPADM

## 2019-05-21 RX ORDER — NALBUPHINE HCL 10 MG/ML
5 AMPUL (ML) INJECTION EVERY 4 HOURS PRN
Status: DISCONTINUED | OUTPATIENT
Start: 2019-05-21 | End: 2019-05-21 | Stop reason: HOSPADM

## 2019-05-21 RX ADMIN — SODIUM CHLORIDE, POTASSIUM CHLORIDE, SODIUM LACTATE AND CALCIUM CHLORIDE: 600; 310; 30; 20 INJECTION, SOLUTION INTRAVENOUS at 20:33

## 2019-05-21 RX ADMIN — DIPHENHYDRAMINE HYDROCHLORIDE 25 MG: 50 INJECTION, SOLUTION INTRAMUSCULAR; INTRAVENOUS at 22:53

## 2019-05-21 RX ADMIN — PHENYLEPHRINE HYDROCHLORIDE 100 MCG: 10 INJECTION INTRAVENOUS at 21:13

## 2019-05-21 RX ADMIN — SODIUM CHLORIDE, POTASSIUM CHLORIDE, SODIUM LACTATE AND CALCIUM CHLORIDE: 600; 310; 30; 20 INJECTION, SOLUTION INTRAVENOUS at 21:28

## 2019-05-21 RX ADMIN — CLINDAMYCIN PHOSPHATE 900 MG: 900 INJECTION, SOLUTION INTRAVENOUS at 20:12

## 2019-05-21 RX ADMIN — NALBUPHINE HYDROCHLORIDE 5 MG: 10 INJECTION, SOLUTION INTRAMUSCULAR; INTRAVENOUS; SUBCUTANEOUS at 22:05

## 2019-05-21 RX ADMIN — GENTAMICIN SULFATE 500 MG: 40 INJECTION, SOLUTION INTRAMUSCULAR; INTRAVENOUS at 20:12

## 2019-05-21 RX ADMIN — PHENYLEPHRINE HYDROCHLORIDE 100 MCG: 10 INJECTION INTRAVENOUS at 20:59

## 2019-05-21 RX ADMIN — PHENYLEPHRINE HYDROCHLORIDE 100 MCG: 10 INJECTION INTRAVENOUS at 20:51

## 2019-05-21 RX ADMIN — BUPIVACAINE HYDROCHLORIDE IN DEXTROSE 1.6 ML: 7.5 INJECTION, SOLUTION SUBARACHNOID at 20:42

## 2019-05-21 RX ADMIN — PHENYLEPHRINE HYDROCHLORIDE 100 MCG: 10 INJECTION INTRAVENOUS at 21:22

## 2019-05-21 RX ADMIN — ONDANSETRON HYDROCHLORIDE 4 MG: 2 INJECTION, SOLUTION INTRAMUSCULAR; INTRAVENOUS at 21:04

## 2019-05-21 RX ADMIN — Medication 10 MG: at 20:48

## 2019-05-21 RX ADMIN — SODIUM CHLORIDE, POTASSIUM CHLORIDE, SODIUM LACTATE AND CALCIUM CHLORIDE: 600; 310; 30; 20 INJECTION, SOLUTION INTRAVENOUS at 16:18

## 2019-05-21 RX ADMIN — PHENYLEPHRINE HYDROCHLORIDE 100 MCG: 10 INJECTION INTRAVENOUS at 20:48

## 2019-05-21 RX ADMIN — Medication 5 MG: at 20:45

## 2019-05-21 RX ADMIN — KETOROLAC TROMETHAMINE 15 MG: 30 INJECTION, SOLUTION INTRAMUSCULAR; INTRAVENOUS at 22:53

## 2019-05-21 RX ADMIN — SODIUM CITRATE AND CITRIC ACID MONOHYDRATE 30 ML: 500; 334 SOLUTION ORAL at 20:10

## 2019-05-21 RX ADMIN — Medication 999 ML/HR: at 21:04

## 2019-05-21 RX ADMIN — PHENYLEPHRINE HYDROCHLORIDE 100 MCG: 10 INJECTION INTRAVENOUS at 20:53

## 2019-05-21 RX ADMIN — PHENYLEPHRINE HYDROCHLORIDE 100 MCG: 10 INJECTION INTRAVENOUS at 20:45

## 2019-05-21 RX ADMIN — MORPHINE SULFATE 0.15 MG: 1 INJECTION, SOLUTION EPIDURAL; INTRATHECAL; INTRAVENOUS at 20:42

## 2019-05-21 ASSESSMENT — PULMONARY FUNCTION TESTS
PIF_VALUE: 0
PIF_VALUE: 1
PIF_VALUE: 0

## 2019-05-21 ASSESSMENT — PAIN SCALES - GENERAL
PAINLEVEL_OUTOF10: 4
PAINLEVEL_OUTOF10: 3

## 2019-05-21 NOTE — PROGRESS NOTES
19     RE:  Jarred Rodriguez   : 1985   AGE: 26 y.o. REFERRING PHYSICIAN:  Alfred Ferguson MD    Dear   Mrs. Jarred Rodirguez a 26 y.o.  [id number]  is seen today on follow up in our office. REASON FOR APPOINTMENT:  · Follow up on a pregnant patient with morbid obesity, positive screen for gestational diabetes one hour GTT, status post gastric bypass surgery, advanced maternal age, essential hypertension,and first trimester exposure to an ACE inhibitor. MEDICATIONS:    Prenatal Vitamins once per day   Ferrous sulfate 325 mg by mouth per day. Labetalol 100 mg by mouth 3 times a day. Carafate 500 mg by mouth every three hours. Prilosec 20 mg by mouth per day. Flonase for treatment of sinus problems    INTERVAL HISTORY:  Mrs Jarred Rodriguez had an uneventful course of pregnancy since her last visit to our office. When seen today in our office she was complaining of decreased fetal movements. PHYSICAL EXAMINATION:  General Appearance:  Healthy looking, alert, no acute distress. Eyes:     No pallor, no icterus, no photophobia. Ears:     No ear drainage. Nose:     No nasal drainage, no paranasal sinus tenderness. Throat:   Mucosa moist, no oral thrush, no exudate. Neck:     No nuchal rigidity. Back:     No CVA tenderness. Abdomen:    Soft nontender. Extremities:    No pretibial pitting edema, no calf muscle tenderness. Skin:     No rashes, no lesions. BP: 134/81 Weight: (!) 343 lb (155.6 kg) Height: 5' 1\" (154.9 cm) Pulse: 80     Body mass index is 64.81 kg/m². Urine dipstick:  Glucose : Negative   Albumin:  Trace       An ultrasound evaluation was done in our office today. Please refer to the enclosed copy of the ultrasound report for further information. IMPRESSION:  1. A  37w3d  intrauterine gestation. 2. Polyhydramnios. 3. Decreased fetal movements. 4. Nonreactive nonstress test.  5. Advanced maternal age.   6. Excessive fetal growth  7. Elevated one hour 50 g Glucola test on 3/9/2019.  8. Could not tolerate the three-hour glucose tolerance test in your office (threw up)  9. Essential hypertension. 10. Morbid obesity. 11. Previous  delivery  12. First trimester exposure to ACE inhibitor lisinopril. 13. Status post gastric bypass surgery. RECOMMENDATIONS/PLAN:  I discussed with the patient the following points:    1. The size of her baby is now at the 83rd percentile appropriate for gestational age. 2. Polyhydramnios is noted again. 3. Her blood pressures are well controlled with the present dose of Labetalol. Essential hypertension is associated with an increased risk of developing intrauterine growth restriction and an increased risk of developing superimposed preeclampsia. The systolic should be kept under 556, diastolic under 286.  4. The nonstress test today was not reactive, polyhydramnios is noted again and she is complaining of decreased fetal movements and therefore I recommend delivery today. 5. She was sent to the labor unit of [facility] in [facility] with instructions not to eat or drink on her way to the hospital.    Thank you again, doctor, for allowing us to be of service to your patient. If I can be of further assistance, please do not hesitate to call. Sincerely,        Jenna Vera M.D., Krish Watson    Current encounter billing:  UT OFFICE OUTPATIENT VISIT 25 MINUTES [[id number]]  US OB 1 or More Fetus Limited [73889 Custom]  Biophysical profile [OBO12 Custom]  US Doppler Fetal Umbilical Artery [[id number] Custom]    **This report has been created using voice recognition software. It may contain minor errors     which are inherent in voice recognition technology**                  NON STRESS TEST INTERPRETATION    19    RE:  Priyank Beltran   : 1985   AGE: 26 y.o. GESTATIONAL AGE:  37w3d    DIAGNOSIS:     Essential hypertension. Advanced maternal age. Post gastric bypass.    Morbid

## 2019-05-21 NOTE — LETTER
NON STRESS TEST INTERPRETATION    19    RE:  Olivier Began   : 1985   AGE: 26 y.o. GESTATIONAL AGE:  37w3d    DIAGNOSIS:     Essential hypertension. Advanced maternal age. Post gastric bypass. Morbid obesity. INDICATION:  Essential hypertension. TIME ON:  9:52 AM      TIME OFF:  10:13 AM      RESULT:   Non REACTIVE      FHR Baseline Rate:   150 bpm    PERIODIC CHANGES:    · Accelerations: Absent. · Variability minimal.  · No decelerations noted. COMMENTS:      Patient was sent to the labor unit of [address] [place] [address] for delivery today.         Reena Guo MD

## 2019-05-21 NOTE — H&P
Department of Obstetrics and Gynecology  Physician Assistant Obstetrics History and Physical      HISTORY OF PRESENT ILLNESS:      The patient is a 26 y.o.  5 parity 2 at 40 weeks' 3 days' gestation presents to L&D from [facility] office for delivery secondary to non-reactive NST, polyhydramnios, and c/o decreased fetal movements. Her BP was 134/81 at [place] [address] office, currently 157/67. Patient denies headache, visual changes, RUQ or epigastric pain, cramping, contractions, spotting, or any urinary symptoms. She reports having lower extremity edema for the past few weeks.   She last ate at 09:30 am.    Current obstetric history is significant for:  Advanced maternal age   Elderly multigravida  Chronic hypertension  Maternal morbid obesity: BMI 64.81 kg/m2  Polyhydramnios  Previous  section  Excessive fetal growth  First trimester exposure to an ACE inhibitor- lisinopril  S/o gastric bypass surgery  Elevated 1 hour 50 g Glucola test 3/9/19  Could not tolerate 3 hour GTT (emesis at office)    Estimated Due Date:  2019  Contractions: No  Leaking of fluid: No  Bleeding:  No  Perceived fetal movement: Decreased    Group B Strep: unknown      PAST OB HISTORY:  OB History    Para Term  AB Living   5 2 2   2 2   SAB TAB Ectopic Molar Multiple Live Births   1         2      # Outcome Date GA Lbr Rodrigo/2nd Weight Sex Delivery Anes PTL Lv   5 Current            3 Term 11/04/15   7 lb 4 oz (3.289 kg) F CS-LTranv EPI N THOR      Birth Comments: fetal hr droppped      Complications: Fetal Intolerance   2 Term 12 39w3d  6 lb 12 oz (3.062 kg) F Vag-Spont EPI N THOR   1 SAB            Has a 2nd SAB         Pre-eclampsia:  No      :  Yes x 1      D & C:  No      Cerclage:  No      LEEP:  No      Myomectomy:  No       Labor: No    Past Medical History:    Diagnosis Date    Abnormal Pap smear     tested for hpv 5-6yrs ago     Gastritis     Hypertension         Past Surgical History: Procedure Laterality Date     SECTION      COLONOSCOPY      GASTRIC BYPASS SURGERY  2004    TONSILLECTOMY  2003    UPPER GASTROINTESTINAL ENDOSCOPY  3/9/2016    Dr. Hudson Ambrocio, Findings: GE REflux, Gastritis        Social History:    Reports that she has never smoked. She has never used smokeless tobacco. She reports that she drinks alcohol. She reports that she does not use drugs. Medications Prior to Admission:  Medications Prior to Admission: fluticasone (FLONASE) 50 MCG/ACT nasal spray, 2 sprays by Each Nare route daily  ferrous sulfate 325 (65 Fe) MG tablet, Take 325 mg by mouth daily (with breakfast)  labetalol (NORMODYNE) 100 MG tablet, Take 100 mg by mouth 3 times daily  Prenatal Vit-Fe Fumarate-FA (PRENATAL VITAMIN PO), Take 1 tablet by mouth  sucralfate (CARAFATE) 1 GM tablet, Take 0.5 tablets by mouth every 3 hours Patient is to take 1/2 tablet every 3 hours while awake  omeprazole (PRILOSEC) 20 MG capsule, Take 1 capsule by mouth Daily (Patient taking differently: Take 40 mg by mouth Daily )    Allergies:  Penicillins- throat swelling    ROS:  Const: No fever, chills, night sweats, no recent unexplained weight gain/loss  HEENT: No blurred vision, double vision; no ear problems; no sore throat, congestion; no running nose. Resp: No cough, no sputum, no pleuritic chest pain, no sob  Cardio: (+) LE edema. No chest pain, no exertional dyspnea, no PND, no orthopnea, no palpitation   GI: No dysphagia, no reflux; no abdominal pain, no n/v; no c/d.  No hematochezia    : No dysuria, no frequency, hesitancy; no hematuria  MSK: no joint pain, no myalgia, no change in ROM  Neuro: no focal weakness in extremities, no slurred speech, no double vision, no numbness or tingling in extremities  Endo: no heat/cold intolerance, no polyphagia, polydipsia or polyuria  Hem: no increased bleeding, no bruising, no lymphadenopathy  Skin: no skin changes  Psych: no depressed mood, no suicidal ideation  Pertinent +'s & -'s addressed in HPI    PHYSICAL EXAM:  Temp 98.2 °F (36.8 °C) (Oral)   Resp 20   Ht 5' 1\" (1.549 m)   Wt (!) 343 lb (155.6 kg)   BMI 64.81 kg/m²        BP *157/67,  HR 88, RR 18        General appearance: Comfortable  Lungs:  CTA bilaterally, good excursion  Heart:  Regular Rate & Rhythm, no murmur noted  Abdomen:  Soft, non-tender, gravid  Uterus: soft, nontender  Fetal heart rate:  Baseline Heart Rate 150; variability: minimal to moderate;  accelerations: 8 to 10 beats, did not meet criteria for reactivity initially;  decelerations: absent; At 11:24, variability and accelerations improved, meeting criteria for reactivity  Cervix: deferred  Presentation: deferred  Contraction frequency:  None, mild irritability  Membranes:  Intact  Extremities: (+) LE edema, (-) clonus  Knee jerk: not elevated       ASSESSMENT:   25 yo  IUP at 37 weeks' 3 days' gestation   Sent from [place] [address] office for delivery    NRNST + c/o decreased fetal movement  Polyhydramnios    Previous  section   Essential HTN  Morbid obesity  Elderly multigravida  S/p gastric bypass         Plan:  I discussed above with Dr. Shana Rojas.   Orders per her:  EFM  Admit  Cycle BP's  IVF with LR  PIH labs  Type & Screen  Plan for repeat low transverse  section  Bicitra  PCD's  Pitocin  Clindamycin & gentamycin: have pharmacy dose  Anesthesia consult      Electronically signed by Timmy Walls PA-C on 2019 at 10:39 AM

## 2019-05-21 NOTE — LETTER
19     RE:  Jose Figueroa   : 1985   AGE: 26 y.o. REFERRING PHYSICIAN:  Kylah Gaona MD    Dear   Mrs. Jose Figueroa a 26 y.o.  [id number]  is seen today on follow up in our office. REASON FOR APPOINTMENT:  · Follow up on a pregnant patient with morbid obesity, positive screen for gestational diabetes one hour GTT, status post gastric bypass surgery, advanced maternal age, essential hypertension,and first trimester exposure to an ACE inhibitor. MEDICATIONS:    Prenatal Vitamins once per day   Ferrous sulfate 325 mg by mouth per day. Labetalol 100 mg by mouth 3 times a day. Carafate 500 mg by mouth every three hours. Prilosec 20 mg by mouth per day. Flonase for treatment of sinus problems    INTERVAL HISTORY:  Mrs Jose Figueroa had an uneventful course of pregnancy since her last visit to our office. When seen today in our office she was complaining of decreased fetal movements. PHYSICAL EXAMINATION:  General Appearance:  Healthy looking, alert, no acute distress. Eyes:     No pallor, no icterus, no photophobia. Ears:     No ear drainage. Nose:     No nasal drainage, no paranasal sinus tenderness. Throat:   Mucosa moist, no oral thrush, no exudate. Neck:     No nuchal rigidity. Back:     No CVA tenderness. Abdomen:    Soft nontender. Extremities:    No pretibial pitting edema, no calf muscle tenderness. Skin:     No rashes, no lesions. BP: 134/81 Weight: (!) 343 lb (155.6 kg) Height: 5' 1\" (154.9 cm) Pulse: 80     Body mass index is 64.81 kg/m². Urine dipstick:  Glucose : Negative   Albumin:  Trace       An ultrasound evaluation was done in our office today. Please refer to the enclosed copy of the ultrasound report for further information. IMPRESSION:  1. A  37w3d  intrauterine gestation. 2. Polyhydramnios. 3. Decreased fetal movements. 4. Nonreactive nonstress test.  5. Advanced maternal age.   6. Excessive fetal growth

## 2019-05-21 NOTE — PATIENT INSTRUCTIONS
You might be having an NST at your next appt. Please eat a large snack or breakfast before coming to office. Thank youCall your primary obstetrician with bleeding, leaking of fluid, abdominal tenderness, headache, blurry vision, epigastric pain and increased urinary frequency. Any questions contact Elenita Tam at [phone]. If you are experiencing an emergency and need immediate help, call 911 or go to go emergency room or labor and delivery. Do kick counts after dinner. Call your primary obstetrician if less than 10 kicks in 2 hours after dinner. Call your primary obstetrician with bleeding, leaking of fluid, abdominal tenderness, headache, blurry vision, epigastric pain and increased urinary frequency. if you are sick, not feeling well or have an infectious process going on please reschedule your appointment by calling [phone]. Also if any family members are not feeling well, please do not bring them to your appointment. We appreciate your cooperation. We are doing this in order to protect our pregnant mothers+ their babies. Patient Education        Week 37 of Your Pregnancy: Care Instructions  Your Care Instructions    You are near the end of your pregnancy--and you're probably pretty uncomfortable. It may be harder to walk around. Lying down probably isn't comfortable either. You may have trouble getting to sleep or staying asleep. Most women deliver their babies between 40 and 41 weeks. This is a good time to think about packing a bag for the hospital with items you'll need. Then you'll be ready when labor starts. Follow-up care is a key part of your treatment and safety. Be sure to make and go to all appointments, and call your doctor if you are having problems. It's also a good idea to know your test results and keep a list of the medicines you take. How can you care for yourself at home? Learn about breastfeeding  · Breastfeeding is best for your baby and good for you.   · Breast milk has antibodies to help your baby fight infections. · Mothers who breastfeed often lose weight faster, because making milk burns calories. · Learning the best ways to hold your baby will make breastfeeding easier. · Let your partner bathe and diaper the baby to keep your partner from feeling left out. Snuggle together when you breastfeed. · You may want to learn how to use a breast pump and store your milk. · If you choose to bottle feed, make the feeding feel like breastfeeding so you can bond with your baby. Always hold your baby and the bottle. Do not prop bottles or let your baby fall asleep with a bottle. Learn about crying  · It is common for babies to cry for 1 to 3 hours a day. Some cry more, some cry less. · Babies don't cry to make you upset or because you are a bad parent. · Crying is how your baby communicates. Your baby may be hungry; have gas; need a diaper change; or feel cold, warm, tired, lonely, or tense. Sometimes babies cry for unknown reasons. · If you respond to your baby's needs, he or she will learn to trust you. · Try to stay calm when your baby cries. Your baby may get more upset if he or she senses that you are upset. Know how to care for your   · Your baby's umbilical cord stump will drop off on its own, usually between 1 and 2 weeks. To care for your baby's umbilical cord area:  ? Clean the area at the bottom of the cord 2 or 3 times a day. ? Pay special attention to the area where the cord attaches to the skin. ? Keep the diaper folded below the cord. ? Use a damp washcloth or cotton ball to sponge bathe your baby until the stump has come off. · Your baby's first dark stool is called meconium. After the meconium is passed, your baby will develop his or her own bowel pattern. ? Some babies, especially  babies, have several bowel movements a day. Others have one or two a day, or one every 2 to 3 days. ?  babies often have loose, yellow stools.  Formula-fed babies have more formed stools. ? If your baby's stools look like little pellets, he or she is constipated. After 2 days of constipation, call your baby's doctor. · If your baby will be circumcised, you can care for him at home. ? Gently rinse his penis with warm water after every diaper change. Do not try to remove the film that forms on the penis. This film will go away on its own. Pat dry. ? Put petroleum ointment, such as Vaseline, on the area of the diaper that will touch your baby's penis. This will keep the diaper from sticking to your baby. ? Ask the doctor about giving your baby acetaminophen (Tylenol) for pain. Where can you learn more? Go to https://OYE!.Technorides. org and sign in to your [employer] account. Enter 37 21 67 in the [employer] box to learn more about \"Week 37 of Your Pregnancy: Care Instructions. \"     If you do not have an account, please click on the \"Sign Up Now\" link. Current as of: September 5, 2018  Content Version: 12.0  © 5913-4986 [employer]. Care instructions adapted under license by [facility] ([facility]). If you have questions about a medical condition or this instruction, always ask your healthcare professional. [place] [address] any warranty or liability for your use of this information. Patient Education        Learning About When to Call Your Doctor During Pregnancy (After 20 Weeks)  Your Care Instructions  It's common to have concerns about what might be a problem during pregnancy. Although most pregnant women don't have any serious problems, it's important to know when to call your doctor if you have certain symptoms or signs of labor. These are general suggestions. Your doctor may give you some more information about when to call. When to call your doctor (after 20 weeks)  Call 911 anytime you think you may need emergency care. For example, call if:  · You have severe vaginal bleeding.   · You have sudden, severe pain in your belly. · You passed out (lost consciousness). · You have a seizure. · You see or feel the umbilical cord. · You think you are about to deliver your baby and can't make it safely to the hospital.  Call your doctor now or seek immediate medical care if:  · You have vaginal bleeding. · You have belly pain. · You have a fever. · You have symptoms of preeclampsia, such as:  ? Sudden swelling of your face, hands, or feet. ? New vision problems (such as dimness or blurring). ? A severe headache. · You have a sudden release of fluid from your vagina. (You think your water broke.)  · You think that you may be in labor. This means that you've had at least 4 contractions within 20 minutes or at least 8 contractions in an hour. · You notice that your baby has stopped moving or is moving much less than normal.  · You have symptoms of a urinary tract infection. These may include:  ? Pain or burning when you urinate. ? A frequent need to urinate without being able to pass much urine. ? Pain in the flank, which is just below the rib cage and above the waist on either side of the back. ? Blood in your urine. Watch closely for changes in your health, and be sure to contact your doctor if:  · You have vaginal discharge that smells bad. · You have skin changes, such as:  ? A rash. ? Itching. ? Yellow color to your skin. · You have other concerns about your pregnancy. If you have labor signs at 37 weeks or more  If you have signs of labor at 37 weeks or more, your doctor may tell you to call when your labor becomes more active. Symptoms of active labor include:  · Contractions that are regular. · Contractions that are less than 5 minutes apart. · Contractions that are hard to talk through. Follow-up care is a key part of your treatment and safety. Be sure to make and go to all appointments, and call your doctor if you are having problems.  It's also a good idea to know your test results and keep a list of the medicines you take. Where can you learn more? Go to https://chpepiceweb.healthClaimIt. org and sign in to your [employer] account. Enter  in the [facility] box to learn more about \"Learning About When to Call Your Doctor During Pregnancy (After 20 Weeks). \"     If you do not have an account, please click on the \"Sign Up Now\" link. Current as of: September 5, 2018  Content Version: 12.0  © 6766-0957 [employer]uffer[employer]. Care instructions adapted under license by [facility] ([facility]). If you have questions about a medical condition or this instruction, always ask your healthcare professional. [place] [address] any warranty or liability for your use of this information. Patient Education        Counting Your Baby's Kicks: Care Instructions  Your Care Instructions    Counting your baby's kicks is one way your doctor can tell that your baby is healthy. Most women--especially in a first pregnancy--feel their baby move for the first time between 16 and 22 weeks. The movement may feel like flutters rather than kicks. Your baby may move more at certain times of the day. When you are active, you may notice less kicking than when you are resting. At your prenatal visits, your doctor will ask whether the baby is active. In your last trimester, your doctor may ask you to count the number of times you feel your baby move. Follow-up care is a key part of your treatment and safety. Be sure to make and go to all appointments, and call your doctor if you are having problems. It's also a good idea to know your test results and keep a list of the medicines you take. How do you count fetal kicks? · A common method of checking your baby's movement is to count the number of kicks or moves you feel in 1 hour. Ten movements (such as kicks, flutters, or rolls) in 1 hour are normal. Some doctors suggest that you count in the morning until you get to 10 movements.  Then you can quit for that day and start again the next day. · Pick your baby's most active time of day to count. This may be any time from morning to evening. · If you do not feel 10 movements in an hour, your baby may be sleeping. Wait for the next hour and count again. When should you call for help? Call your doctor now or seek immediate medical care if:    · You noticed that your baby has stopped moving or is moving much less than normal.    Watch closely for changes in your health, and be sure to contact your doctor if you have any problems. Where can you learn more? Go to https://chpepiceweb.Jintronix. org and sign in to your Trov account. Enter T727 in the [employer] box to learn more about \"Counting Your Baby's Kicks: Care Instructions. \"     If you do not have an account, please click on the \"Sign Up Now\" link. Current as of: September 5, 2018  Content Version: 12.0  © 7006-6611 [employer]Keeper. Care instructions adapted under license by [facility] ([facility]). If you have questions about a medical condition or this instruction, always ask your healthcare professional. [place] [address] any warranty or liability for your use of this information. Patient Education        High Blood Pressure in Pregnancy: Care Instructions  Your Care Instructions    High blood pressure (hypertension) means that the force of blood against your artery walls is too strong. Mild high blood pressure during pregnancy is not usually dangerous. Your doctor will probably just want to watch you closely. But when blood pressure is very high, it can reduce oxygen to your baby. This can affect how well your baby grows. High blood pressure also means that you are at higher risk for:  · Preeclampsia. This is a problem that includes high blood pressure and damage to your liver or kidneys. It can also reduce how much oxygen your baby gets. In some cases, it leads to eclampsia. Eclampsia causes seizures.   · Placental you may need emergency care. For example, call if:    · You passed out (lost consciousness).     · You have a seizure.    Call your doctor now or seek immediate medical care if:    · You have symptoms of preeclampsia, such as:  ? Sudden swelling of your face, hands, or feet. ? New vision problems (such as dimness or blurring). ? A severe headache.     · Your blood pressure is higher than it should be or rises suddenly.     · You have new nausea or vomiting.     · You think that you are in labor.     · You have pain in your belly or pelvis.    Watch closely for changes in your health, and be sure to contact your doctor if:    · You gain weight rapidly. Where can you learn more? Go to https://GiPStechpeLykseweb.Formarum. org and sign in to your [employer] account. Enter P230 in the [employer]Stand box to learn more about \"High Blood Pressure in Pregnancy: Care Instructions. \"     If you do not have an account, please click on the \"Sign Up Now\" link. Current as of: September 5, 2018  Content Version: 12.0  © 5845-0947 Healthwise, Incorporated. Care instructions adapted under license by [facility] ([facility]). If you have questions about a medical condition or this instruction, always ask your healthcare professional. [address] any warranty or liability for your use of this information.

## 2019-05-21 NOTE — ANESTHESIA PRE PROCEDURE
Department of Anesthesiology  Preprocedure Note       Name:  Yoko Herrmann   Age:  26 y.o.  :  1985                                          MRN:  [id number]         Date:  2019      Surgeon: Ja Howard):  Andrew Keller DO    Procedure:  SECTION (N/A Uterus)    Medications prior to admission:   Prior to Admission medications    Medication Sig Start Date End Date Taking? Authorizing Provider   fluticasone (FLONASE) 50 MCG/ACT nasal spray 2 sprays by Each Nare route daily   Yes Historical Provider, MD   ferrous sulfate 325 (65 Fe) MG tablet Take 325 mg by mouth daily (with breakfast)   Yes Historical Provider, MD   labetalol (NORMODYNE) 100 MG tablet Take 100 mg by mouth 3 times daily   Yes Historical Provider, MD   Prenatal Vit-Fe Fumarate-FA (PRENATAL VITAMIN PO) Take 1 tablet by mouth   Yes Historical Provider, MD   sucralfate (CARAFATE) 1 GM tablet Take 0.5 tablets by mouth every 3 hours Patient is to take 1/2 tablet every 3 hours while awake 16  Yes Leah Baez MD   omeprazole (PRILOSEC) 20 MG capsule Take 1 capsule by mouth Daily  Patient taking differently: Take 40 mg by mouth Daily  16   Manolo Gagnon MD       Current medications:    No current facility-administered medications for this encounter. Allergies: Allergies   Allergen Reactions    Penicillins Swelling       Problem List:    Patient Active Problem List   Diagnosis Code    Prior pregnancy complicated by PIH, antepartum O09.899    GERD (gastroesophageal reflux disease) K21.9    Essential hypertension affecting pregnancy in second trimester O10.012    Maternal morbid obesity in second trimester, antepartum ([facility] [address] O99.212, E66.01    Suspected damage to fetus from maternal drug use O35. 5XX0    Pelvic pain complicating pregnancy, antepartum O26.899, R10.2    Variable fetal heart rate decelerations, antepartum C00.7741       Past Medical History:        Diagnosis Date    Abnormal Pap smear Lab Results   Component Value Date    PROTIME 11.2 2015    INR 1.0 2015    APTT 25.9 2015       HCG (If Applicable):   Lab Results   Component Value Date    PREGTESTUR NEGATIVE 2016        ABGs: No results found for: PHART, PO2ART, YRO5BYA, UBM2VMV, BEART, I5HXRHUD     Type & Screen (If Applicable):  Lab Results   Component Value Date    LABABO O 2012    [address] POS 2012       Anesthesia Evaluation  Patient summary reviewed and Nursing notes reviewed no history of anesthetic complications:   Airway: Mallampati: II  TM distance: >3 FB   Neck ROM: full  Mouth opening: > = 3 FB Dental:    (+) other      Pulmonary:Negative Pulmonary ROS breath sounds clear to auscultation      (-) pneumonia and recent URI                          ROS comment: Being treated by ENT for sinusitis-some discharge, clears with coughing   Cardiovascular:    (+) hypertension:,         Rhythm: regular  Rate: normal                    Neuro/Psych:   Negative Neuro/Psych ROS     (-) seizures and headaches           GI/Hepatic/Renal:   (+) GERD: well controlled, morbid obesity         ROS comment: hx of GASTRIC BYPASS SURGERY    Chronic gastritis. Endo/Other:    (+) no malignancy/cancer. (-) diabetes mellitus, blood dyscrasia, no malignancy/cancer                ROS comment: Pt sates she has a lypoma on her back-off to the right side of the spine  Abdominal:   (+) obese,         Vascular: negative vascular ROS. - DVT and PE. Anesthesia Plan      general and spinal     ASA 3     (Discussed anesthesia options with patient, explained that GA is possible depending on the status of the baby. Addendum: Pt agrees to spinal anesthesia for upcoming . [place] agrees to [place] if deemed necessary.)        Anesthetic plan and risks discussed with patient. Use of blood products discussed with patient whom consented to blood products.    Plan discussed with CRNA and attending.     Attending anesthesiologist reviewed and agrees with Pre Eval content            Patricia Raphael, ANH   5/21/2019

## 2019-05-21 NOTE — PROGRESS NOTES
NST completed. Baseline 150 with minimal variability. accelelrations do not meet criteria for reactivity. Non-reactive p[er dr Mc Pagan.  Pt to L+D for delivery

## 2019-05-22 LAB
BASOPHILS ABSOLUTE: 0.03 E9/L (ref 0–0.2)
BASOPHILS RELATIVE PERCENT: 0.3 % (ref 0–2)
EOSINOPHILS ABSOLUTE: 0.19 E9/L (ref 0.05–0.5)
EOSINOPHILS RELATIVE PERCENT: 1.7 % (ref 0–6)
HCT VFR BLD CALC: 36.7 % (ref 34–48)
HEMOGLOBIN: 12 G/DL (ref 11.5–15.5)
IMMATURE GRANULOCYTES #: 0.07 E9/L
IMMATURE GRANULOCYTES %: 0.6 % (ref 0–5)
LYMPHOCYTES ABSOLUTE: 1.32 E9/L (ref 1.5–4)
LYMPHOCYTES RELATIVE PERCENT: 12.1 % (ref 20–42)
MCH RBC QN AUTO: 30.6 PG (ref 26–35)
MCHC RBC AUTO-ENTMCNC: 32.7 % (ref 32–34.5)
MCV RBC AUTO: 93.6 FL (ref 80–99.9)
MONOCYTES ABSOLUTE: 0.76 E9/L (ref 0.1–0.95)
MONOCYTES RELATIVE PERCENT: 6.9 % (ref 2–12)
NEUTROPHILS ABSOLUTE: 8.58 E9/L (ref 1.8–7.3)
NEUTROPHILS RELATIVE PERCENT: 78.4 % (ref 43–80)
PDW BLD-RTO: 14.3 FL (ref 11.5–15)
PLATELET # BLD: 137 E9/L (ref 130–450)
PMV BLD AUTO: 12.1 FL (ref 7–12)
RBC # BLD: 3.92 E12/L (ref 3.5–5.5)
WBC # BLD: 11 E9/L (ref 4.5–11.5)

## 2019-05-22 PROCEDURE — 1220000000 HC SEMI PRIVATE OB R&B

## 2019-05-22 PROCEDURE — 85025 COMPLETE CBC W/AUTO DIFF WBC: CPT

## 2019-05-22 PROCEDURE — 6360000002 HC RX W HCPCS: Performed by: OBSTETRICS & GYNECOLOGY

## 2019-05-22 PROCEDURE — 6370000000 HC RX 637 (ALT 250 FOR IP): Performed by: OBSTETRICS & GYNECOLOGY

## 2019-05-22 PROCEDURE — 36415 COLL VENOUS BLD VENIPUNCTURE: CPT

## 2019-05-22 PROCEDURE — 6370000000 HC RX 637 (ALT 250 FOR IP): Performed by: ANESTHESIOLOGY

## 2019-05-22 PROCEDURE — 2580000003 HC RX 258: Performed by: OBSTETRICS & GYNECOLOGY

## 2019-05-22 PROCEDURE — 6360000002 HC RX W HCPCS: Performed by: ANESTHESIOLOGY

## 2019-05-22 RX ORDER — NALBUPHINE HCL 10 MG/ML
5 AMPUL (ML) INJECTION EVERY 4 HOURS PRN
Status: DISCONTINUED | OUTPATIENT
Start: 2019-05-22 | End: 2019-05-24 | Stop reason: HOSPADM

## 2019-05-22 RX ADMIN — KETOROLAC TROMETHAMINE 15 MG: 30 INJECTION, SOLUTION INTRAMUSCULAR; INTRAVENOUS at 19:13

## 2019-05-22 RX ADMIN — NALBUPHINE HYDROCHLORIDE 5 MG: 10 INJECTION, SOLUTION INTRAMUSCULAR; INTRAVENOUS; SUBCUTANEOUS at 11:36

## 2019-05-22 RX ADMIN — Medication 10 ML: at 12:52

## 2019-05-22 RX ADMIN — Medication 1 TABLET: at 09:07

## 2019-05-22 RX ADMIN — KETOROLAC TROMETHAMINE 15 MG: 30 INJECTION, SOLUTION INTRAMUSCULAR; INTRAVENOUS at 05:03

## 2019-05-22 RX ADMIN — ENOXAPARIN SODIUM 80 MG: 80 INJECTION SUBCUTANEOUS at 11:38

## 2019-05-22 RX ADMIN — SODIUM CHLORIDE, POTASSIUM CHLORIDE, SODIUM LACTATE AND CALCIUM CHLORIDE: 600; 310; 30; 20 INJECTION, SOLUTION INTRAVENOUS at 06:00

## 2019-05-22 RX ADMIN — DOCUSATE SODIUM 100 MG: 100 CAPSULE, LIQUID FILLED ORAL at 09:07

## 2019-05-22 RX ADMIN — Medication 10 ML: at 19:13

## 2019-05-22 RX ADMIN — DIPHENHYDRAMINE HYDROCHLORIDE 25 MG: 50 INJECTION, SOLUTION INTRAMUSCULAR; INTRAVENOUS at 05:02

## 2019-05-22 RX ADMIN — KETOROLAC TROMETHAMINE 15 MG: 30 INJECTION, SOLUTION INTRAMUSCULAR; INTRAVENOUS at 12:53

## 2019-05-22 RX ADMIN — OXYCODONE HYDROCHLORIDE AND ACETAMINOPHEN 1 TABLET: 5; 325 TABLET ORAL at 21:45

## 2019-05-22 RX ADMIN — DOCUSATE SODIUM 100 MG: 100 CAPSULE, LIQUID FILLED ORAL at 20:37

## 2019-05-22 RX ADMIN — Medication: at 09:07

## 2019-05-22 ASSESSMENT — PAIN SCALES - GENERAL
PAINLEVEL_OUTOF10: 4
PAINLEVEL_OUTOF10: 0
PAINLEVEL_OUTOF10: 4
PAINLEVEL_OUTOF10: 4
PAINLEVEL_OUTOF10: 6
PAINLEVEL_OUTOF10: 0

## 2019-05-22 ASSESSMENT — PAIN - FUNCTIONAL ASSESSMENT
PAIN_FUNCTIONAL_ASSESSMENT: ACTIVITIES ARE NOT PREVENTED
PAIN_FUNCTIONAL_ASSESSMENT: ACTIVITIES ARE NOT PREVENTED

## 2019-05-22 ASSESSMENT — PAIN DESCRIPTION - RADICULAR PAIN
RADICULAR_PAIN: ABSENT
RADICULAR_PAIN: ABSENT

## 2019-05-22 ASSESSMENT — PAIN DESCRIPTION - DESCRIPTORS
DESCRIPTORS: BURNING;ACHING;DISCOMFORT
DESCRIPTORS: DISCOMFORT

## 2019-05-22 ASSESSMENT — PAIN DESCRIPTION - ORIENTATION
ORIENTATION: LOWER
ORIENTATION: LOWER

## 2019-05-22 ASSESSMENT — PAIN DESCRIPTION - LOCATION
LOCATION: ABDOMEN;INCISION
LOCATION: INCISION

## 2019-05-22 ASSESSMENT — PAIN DESCRIPTION - PROGRESSION
CLINICAL_PROGRESSION: GRADUALLY WORSENING
CLINICAL_PROGRESSION: GRADUALLY WORSENING
CLINICAL_PROGRESSION: RESOLVED

## 2019-05-22 ASSESSMENT — PAIN DESCRIPTION - ONSET
ONSET: GRADUAL
ONSET: GRADUAL

## 2019-05-22 ASSESSMENT — PAIN DESCRIPTION - FREQUENCY
FREQUENCY: INTERMITTENT
FREQUENCY: INTERMITTENT

## 2019-05-22 ASSESSMENT — PAIN DESCRIPTION - PAIN TYPE
TYPE: SURGICAL PAIN
TYPE: SURGICAL PAIN

## 2019-05-22 NOTE — FLOWSHEET NOTE
Thayer discontinued for 950 cc of tea color urine . Dtv [id number]. Instructed to call for assist when needs up to void for first time.

## 2019-05-22 NOTE — ANESTHESIA POSTPROCEDURE EVALUATION
Department of Anesthesiology  Postprocedure Note    Patient: Stanton Greer  MRN: [id number]  YOB: 1985  Date of evaluation: 2019  Time:  10:05 AM     Procedure Summary     Date:  19 Room / Location:  [facility] L&D OR    Anesthesia Start:   Anesthesia Stop:      Procedure:   SECTION (N/A Uterus) Diagnosis:  (csection)    Surgeon:  Laura He DO Responsible Provider:  Jose Raul Cole MD    Anesthesia Type:  general, spinal ASA Status:  3          Anesthesia Type: general, spinal    Edith Phase I: Edith Score: 10    Edith Phase II:      Last vitals: Reviewed and per EMR flowsheets.        Anesthesia Post Evaluation    Patient location during evaluation: bedside  Patient participation: complete - patient participated  Level of consciousness: awake  Pain score: 1  Airway patency: patent  Nausea & Vomiting: no nausea and no vomiting  Complications: no  Cardiovascular status: hemodynamically stable  Respiratory status: acceptable  Hydration status: euvolemic

## 2019-05-22 NOTE — PROGRESS NOTES
Subjective:     Postpartum Day 1:  Delivery    The patient feels well. Pain is well controlled with current medications. Baby is feeding via breast. Urinary output is inadequate. The patient is not ambulating well. The patient is tolerating a normal diet. Flatus denies been passed. Objective:        Vitals:    19 0712   BP: (!) 118/58   Pulse: 67   Resp: 16   Temp: 98.3 °F (36.8 °C)   SpO2:          Intake/Output Summary (Last 24 hours) at 2019 0845  Last data filed at 2019 8921  Gross per 24 hour   Intake 1100 ml   Output 1000 ml   Net 100 ml     Lab Results   Component Value Date    WBC 11.0 2019    HGB 12.0 2019    HCT 36.7 2019    MCV 93.6 2019     2019       General:    alert, appears stated age and cooperative       Lochia:  appropriate   Uterine    firm   Incision:  dressing intact   DVT Evaluation:  No evidence of DVT seen on physical exam.     Assessment:     Status post  section. Doing well postoperatively. Plan:     Continue current care.

## 2019-05-22 NOTE — OP NOTE
PREOPERATIVE DIAGNOSES:     1.  37 week intrauterine pregnancy. 2.  Prior  section  3. Nonreactive fetal testing  4. Polyhydramnios      POSTOPERATIVE DIAGNOSES:     Same.      PROCEDURE:  Repeat low transverse  section.      SURGEON:  Barb Caiecdo DO      ESTIMATED BLOOD WBNC: 933      COMPLICATIONS:  None.         ANESTHESIA: spinal     FINDINGS: FINDINGS:  Benedict Anderson  Sex:  female  Fetal Position:  Cephalic, occiput anterior  Apgars:  APGAR One: N/A  APGAR Five: N/A  Weight:  [unfilled]  Tubes, uterus, ovaries:  normal    INDICATION/CONSENT: Risks were discussed with patient including bleeding requiring transfusion, infection, injury to bowel/urinary tract, anesthesia, postop thromboembolism and cardiorespiratory complications. Gives consent.         DETAILS OF PROCEDURE: After satisfactory anesthesia was instituted, the patient was prepped and draped in usual sterile fashion. A Pfannenstiel skin incision was made using a sharp scalpel and carried down to the fascia using Bovie cautery. Bovie cautery was used to control hemostasis where needed. The fascia was then incised with Bovie cautery and extended laterally. Kocher clamps were then used to grasp the fascia both superiorly and inferiorly using blunt dissection and Bovie cautery. The midline was bluntly divided and the peritoneal cavity entered bluntly. The incision was extended with blunt dissection and extended. A bladder blade  was placed and secured. A bladder flap was created in the lower uterine segment using Metzenbaum scissors and blunt dissection. An incision was made in the lower uterine segment with a sharp scalpel and extended laterally with blunt dissection. Rupture of membranes for clear fluid was performed and a female  was delivered from Cephalic position with assistance of mighty vac. Pop off X2. . The cord was clamped and baby was handed to the awaiting attendants. Apgar's and weight are found above.  Cord blood and gases were

## 2019-05-22 NOTE — PROGRESS NOTES
First Assist Brief Operative Note    PreOp DX:  37w3d Pregnancy; EDC= 19  MFM recommended delivery  NRNST + c/o decreased fetal movement  Polyhydramnios  Previous  section   Essential HTN  Morbid obesity  Elderly multigravida  S/p gastric bypass  Patient Active Problem List   Diagnosis    Prior pregnancy complicated by PIH, antepartum    GERD (gastroesophageal reflux disease)    Essential hypertension affecting pregnancy in second trimester    Maternal morbid obesity in second trimester, antepartum ([address]    Suspected damage to fetus from maternal drug use    Pelvic pain complicating pregnancy, antepartum    Variable fetal heart rate decelerations, antepartum    37 3/7 weeks gestation of pregnancy    NST (non-stress test) nonreactive    Elevated blood pressure affecting pregnancy in third trimester, antepartum    Polyhydramnios affecting pregnancy in third trimester    Decreased fetus movements affecting management of mother in third trimester    Polyhydramnios      Post OP Dx:  37w3d Pregnancy delivered by   Living female baby delivered     Procedure:  Surgery/ First Assistance     Anesthesia: Spinal     Summary: The patient was identified and a Time Out Performed. All were in agreement. Under Spinal Anesthesia the abdomen was prepared and draped using fire prevention and safety protocols. With my technical assistance Dr. Belia Melgoza performed  an uncomplicated  Section, opening the abdomen, incising the uterus and delivering a living female baby at 2103, weighing 7lbs5 oz/3310 gms with Apgar: 8/9. The uterus and the abdomen were closed per routine. Procedure was well tolerated. I first assisted with opening the patient, tissue retraction, delivery of the baby, manipulation of suture, and closing of the patient. I was present for the entire case. Refer to the Surgeons's Operative Report for details.     James Cooper MD, [address] [facility]  2019 9:36 PM

## 2019-05-23 PROCEDURE — 90471 IMMUNIZATION ADMIN: CPT | Performed by: OBSTETRICS & GYNECOLOGY

## 2019-05-23 PROCEDURE — 1220000000 HC SEMI PRIVATE OB R&B

## 2019-05-23 PROCEDURE — 90715 TDAP VACCINE 7 YRS/> IM: CPT | Performed by: OBSTETRICS & GYNECOLOGY

## 2019-05-23 PROCEDURE — 6370000000 HC RX 637 (ALT 250 FOR IP): Performed by: ANESTHESIOLOGY

## 2019-05-23 PROCEDURE — 6360000002 HC RX W HCPCS: Performed by: OBSTETRICS & GYNECOLOGY

## 2019-05-23 PROCEDURE — 6370000000 HC RX 637 (ALT 250 FOR IP): Performed by: OBSTETRICS & GYNECOLOGY

## 2019-05-23 RX ADMIN — DOCUSATE SODIUM 100 MG: 100 CAPSULE, LIQUID FILLED ORAL at 20:07

## 2019-05-23 RX ADMIN — IBUPROFEN 800 MG: 800 TABLET, FILM COATED ORAL at 03:03

## 2019-05-23 RX ADMIN — OXYCODONE HYDROCHLORIDE AND ACETAMINOPHEN 1 TABLET: 5; 325 TABLET ORAL at 19:21

## 2019-05-23 RX ADMIN — TETANUS TOXOID, REDUCED DIPHTHERIA TOXOID AND ACELLULAR PERTUSSIS VACCINE, ADSORBED 0.5 ML: 5; 2.5; 8; 8; 2.5 SUSPENSION INTRAMUSCULAR at 17:16

## 2019-05-23 RX ADMIN — DOCUSATE SODIUM 100 MG: 100 CAPSULE, LIQUID FILLED ORAL at 09:36

## 2019-05-23 RX ADMIN — Medication 1 TABLET: at 09:36

## 2019-05-23 RX ADMIN — OXYCODONE HYDROCHLORIDE AND ACETAMINOPHEN 1 TABLET: 5; 325 TABLET ORAL at 06:22

## 2019-05-23 RX ADMIN — IBUPROFEN 800 MG: 800 TABLET, FILM COATED ORAL at 20:08

## 2019-05-23 RX ADMIN — ENOXAPARIN SODIUM 80 MG: 80 INJECTION SUBCUTANEOUS at 09:00

## 2019-05-23 RX ADMIN — IBUPROFEN 800 MG: 800 TABLET, FILM COATED ORAL at 10:49

## 2019-05-23 RX ADMIN — OXYCODONE HYDROCHLORIDE AND ACETAMINOPHEN 1 TABLET: 5; 325 TABLET ORAL at 13:43

## 2019-05-23 ASSESSMENT — PAIN DESCRIPTION - LOCATION
LOCATION: INCISION
LOCATION: INCISION

## 2019-05-23 ASSESSMENT — PAIN DESCRIPTION - ORIENTATION
ORIENTATION: LOWER
ORIENTATION: LOWER

## 2019-05-23 ASSESSMENT — PAIN DESCRIPTION - PAIN TYPE
TYPE: SURGICAL PAIN
TYPE: SURGICAL PAIN

## 2019-05-23 ASSESSMENT — PAIN DESCRIPTION - RADICULAR PAIN
RADICULAR_PAIN: ABSENT
RADICULAR_PAIN: ABSENT

## 2019-05-23 ASSESSMENT — PAIN SCALES - GENERAL
PAINLEVEL_OUTOF10: 0
PAINLEVEL_OUTOF10: 3
PAINLEVEL_OUTOF10: 3
PAINLEVEL_OUTOF10: 0
PAINLEVEL_OUTOF10: 6
PAINLEVEL_OUTOF10: 4
PAINLEVEL_OUTOF10: 0
PAINLEVEL_OUTOF10: 4
PAINLEVEL_OUTOF10: 3

## 2019-05-23 ASSESSMENT — PAIN DESCRIPTION - FREQUENCY
FREQUENCY: INTERMITTENT
FREQUENCY: INTERMITTENT

## 2019-05-23 ASSESSMENT — PAIN DESCRIPTION - ONSET: ONSET: GRADUAL

## 2019-05-23 ASSESSMENT — PAIN DESCRIPTION - DESCRIPTORS
DESCRIPTORS: DISCOMFORT
DESCRIPTORS: DISCOMFORT

## 2019-05-23 NOTE — PROGRESS NOTES
Section Daily Progress Note    Postpartum Day 2:  Delivery    S: The patient feels well. Pain is well controlled with current medications. The patient is ambulating well. The patient is tolerating a normal diet. Flatus has been passed. O:  Vitals:    19 1919 19 2100 19 0705   BP:  (!) 117/57  123/63   Pulse: 86 84 80 81   Resp: 18 16 16 16   Temp:  98.5 °F (36.9 °C)  97.9 °F (36.6 °C)   TempSrc:  Oral  Oral   SpO2: 96%  98%    Weight:       Height:           Gen: NAD, A&Ox3  Abd: soft, appropriately tender, ND, fundus firm below umbilicus   Incision: c/d/i without erythema  Ext: well perfused    Lab Results   Component Value Date    WBC 11.0 2019    HGB 12.0 2019    HCT 36.7 2019    MCV 93.6 2019     2019       ASSESSMENT/PLAN:   Status post  section. Doing well postoperatively.  POD#2.   -Continue routine postpartum care  -Postop Hgb 12  -Female infant  -Franck Fitzgerald MD  OBGYN

## 2019-05-24 VITALS
TEMPERATURE: 98.4 F | HEART RATE: 80 BPM | RESPIRATION RATE: 16 BRPM | HEIGHT: 61 IN | BODY MASS INDEX: 55.32 KG/M2 | DIASTOLIC BLOOD PRESSURE: 74 MMHG | SYSTOLIC BLOOD PRESSURE: 126 MMHG | WEIGHT: 293 LBS | OXYGEN SATURATION: 98 %

## 2019-05-24 PROCEDURE — 6360000002 HC RX W HCPCS: Performed by: OBSTETRICS & GYNECOLOGY

## 2019-05-24 PROCEDURE — 6370000000 HC RX 637 (ALT 250 FOR IP): Performed by: OBSTETRICS & GYNECOLOGY

## 2019-05-24 RX ORDER — OXYCODONE HYDROCHLORIDE AND ACETAMINOPHEN 5; 325 MG/1; MG/1
1 TABLET ORAL EVERY 6 HOURS PRN
Qty: 20 TABLET | Refills: 0 | Status: SHIPPED | OUTPATIENT
Start: 2019-05-24 | End: 2019-05-29

## 2019-05-24 RX ADMIN — Medication 1 TABLET: at 08:25

## 2019-05-24 RX ADMIN — IBUPROFEN 800 MG: 800 TABLET, FILM COATED ORAL at 04:00

## 2019-05-24 RX ADMIN — DOCUSATE SODIUM 100 MG: 100 CAPSULE, LIQUID FILLED ORAL at 08:25

## 2019-05-24 RX ADMIN — OXYCODONE HYDROCHLORIDE AND ACETAMINOPHEN 1 TABLET: 5; 325 TABLET ORAL at 06:51

## 2019-05-24 RX ADMIN — ENOXAPARIN SODIUM 80 MG: 80 INJECTION SUBCUTANEOUS at 08:24

## 2019-05-24 ASSESSMENT — PAIN SCALES - GENERAL
PAINLEVEL_OUTOF10: 3
PAINLEVEL_OUTOF10: 4

## 2019-05-24 NOTE — PROGRESS NOTES
POD #3    Pt has no complaints. VSS, Afebrile  Abdomen:  Soft, NT  Uterus:  Firm, NT  Incision: No I/D/E  Extremities: NT, no edema, no katerina's    ASSESSMENT:  1-POD #3      PLAN:  1-Discuss patient's history of hypertension with her. She is not currently taking her blood pressure medications, and blood pressures are normal. She will call her family physician for instructions on restarting her medication.

## 2019-05-24 NOTE — PROGRESS NOTES
Discharge teaching given to patient, all questions answered, instructed on the importance of follow-up visits, verbalized understanding, patient discharged home in stable condition.

## 2019-05-29 ENCOUNTER — ANESTHESIA (OUTPATIENT)
Dept: LABOR AND DELIVERY | Age: 34
End: 2019-05-29
Payer: COMMERCIAL

## 2020-09-30 ENCOUNTER — HOSPITAL ENCOUNTER (OUTPATIENT)
Age: 35
Discharge: HOME | End: 2020-10-02

## 2020-09-30 PROCEDURE — 88305 TISSUE EXAM BY PATHOLOGIST: CPT

## 2023-02-28 NOTE — PROGRESS NOTES
No c/o. Good fetal movement. Kick counts encouraged.  Denies bleeding,lof,ctx,headaches,vissual issues,epigastric discomfortAll questions answered+ information confirmed by pt
book shows: Adequate sugar control with fasting sugars under 92 and 2hr pp under 120 mg/dl. IMPRESSION:  1. A  30w3d  intrauterine gestation. 2. Advanced maternal age. 3. Elevated one hour 50 g Glucola test on 3/9/2019.  4. Could not tolerate the three-hour glucose tolerance test in your office (threw up)  5. Essential hypertension. 6. Morbid obesity. 7. Previous  delivery  8. First trimester exposure to ACE inhibitor lisinopril. 9. Status post gastric bypass surgery. 10. Urinary tract infection, on treatment with Macrobid    RECOMMENDATIONS/PLAN:  I discussed with the patient the following points:    1. The size of her baby is appropriate for gestational age. 2. Her blood pressures are well controlled with the present dose of Labetalol. Essential hypertension is associated with an increased risk of developing intrauterine growth restriction and an increased risk of developing superimposed preeclampsia. The systolic should be kept under 488, diastolic under 475.  3. Her sugars are well controlled. I recommend that she tests her sugars fasting and two hour following each meal for three days prior to her visits to our office and that she brings of her log book for review to our office every visit. 4. Fetal well-being was confirmed today. The amount of fluid around baby is normal.  The Biophysical profile score of 8/8 is reassuring, and the umbilical artery Doppler studies are normal.  5. She should monitor fetal well-being at home by counting movements after dinner. Her baby should  move 10 times in 2 hours; otherwise, she should call your office immediately. She is also to call, if she develops any headaches, blurred vision, abdominal pain, bleeding, or spotting, which are signs of preeclampsia. 6. She is to continue to follow with you in your office for ongoing obstetric care.   7. I recommend a follow up ultrasound evaluation in 2 weeks, to check on fetal well being anatomy and
bone flap in abdomen/None

## (undated) DEVICE — COVER,LIGHT HANDLE,FLX,2/PK: Brand: MEDLINE INDUSTRIES, INC.

## (undated) DEVICE — APPLICATOR PREP 26ML 0.7% IOD POVACRYLEX 74% ISO ALC ST

## (undated) DEVICE — COUNTER NDL 30 COUNT DBL MAG

## (undated) DEVICE — CONTAINER SPEC 64OZ POLYPR PATH SNAP LOK CAP W/ LID

## (undated) DEVICE — TUBING, SUCTION, 3/16" X 12', STRAIGHT: Brand: MEDLINE

## (undated) DEVICE — 3M™ STERI-STRIP™ COMPOUND BENZOIN TINCTURE 40 BAGS/CARTON 4 CARTONS/CASE C1544: Brand: 3M™ STERI-STRIP™

## (undated) DEVICE — ELECTRODE PT RET AD L9FT HI MOIST COND ADH HYDRGEL CORDED

## (undated) DEVICE — DRESSING FOAM POST OPERATIVE 4X10 IN MEPILEX BORDER AG

## (undated) DEVICE — SUTURE MNCRYL STRATAFIX PS 4-0 30CM

## (undated) DEVICE — GLOVE SURG SZ 65 L12IN FNGR THK83MIL CRM POLYISOPRENE

## (undated) DEVICE — BLADE SURG NO20 S STL STR DISP GLASSVAN

## (undated) DEVICE — CESAREAN BIRTH PACK II: Brand: MEDLINE INDUSTRIES, INC.

## (undated) DEVICE — MEDI-VAC YANKAUER SUCTION HANDLE W/BULBOUS TIP: Brand: CARDINAL HEALTH

## (undated) DEVICE — HYPODERMIC SAFETY NEEDLE: Brand: MAGELLAN

## (undated) DEVICE — CONTAINER,SPEC,PNEUM TUBE,3OZ,STRL PATH: Brand: MEDLINE

## (undated) DEVICE — Device: Brand: PORTEX

## (undated) DEVICE — SUTURE CHROMIC GUT SZ 1 L36IN ABSRB BRN L40MM CT 1/2 CIR 915H

## (undated) DEVICE — CATHETERIZATION KIT FOL16 FR 2000 CC DRAINAGE BG LUBRICATH

## (undated) DEVICE — PENCIL ES L3M BTTN SWCH HOLSTER W/ BLDE ELECTRD EDGE

## (undated) DEVICE — SHEET,DRAPE,53X77,STERILE: Brand: MEDLINE

## (undated) DEVICE — SUTURE STRATAFIX SYMMETRIC SZ 1 L18IN ABSRB VLT CT1 L36CM SXPP1A404

## (undated) DEVICE — STRIP,CLOSURE,WOUND,MEDI-STRIP,1/2X4: Brand: MEDLINE

## (undated) DEVICE — SPONGE LAP W18XL18IN WHT COT 4 PLY FLD STRUNG RADPQ DISP ST

## (undated) DEVICE — PEN: MARKING STD 100/CS: Brand: MEDICAL ACTION INDUSTRIES

## (undated) DEVICE — TUBE BLD COLLECT ST 1 SIL COAT 7ML 10ML

## (undated) DEVICE — GOWN,SIRUS,FABRNF,L,20/CS: Brand: MEDLINE

## (undated) DEVICE — SUTURE ABSORBABLE MONOFILAMENT 3-0 CT1 18 IN UD MONOCRYL + SXMP1B429

## (undated) DEVICE — 3000CC GUARDIAN II: Brand: GUARDIAN

## (undated) DEVICE — TOWEL,OR,DSP,ST,BLUE,STD,6/PK,12PK/CS: Brand: MEDLINE